# Patient Record
Sex: FEMALE | Race: WHITE | NOT HISPANIC OR LATINO | Employment: OTHER | URBAN - METROPOLITAN AREA
[De-identification: names, ages, dates, MRNs, and addresses within clinical notes are randomized per-mention and may not be internally consistent; named-entity substitution may affect disease eponyms.]

---

## 2017-06-16 ENCOUNTER — HISTORICAL (OUTPATIENT)
Dept: ADMINISTRATIVE | Facility: HOSPITAL | Age: 62
End: 2017-06-16

## 2017-06-16 LAB
ABS NEUT (OLG): 1.78 X10(3)/MCL (ref 2.1–9.2)
ALBUMIN SERPL-MCNC: 3.9 GM/DL (ref 3.4–5)
ALBUMIN/GLOB SERPL: 1.5 {RATIO}
ALP SERPL-CCNC: 58 UNIT/L (ref 38–126)
ALT SERPL-CCNC: 20 UNIT/L (ref 12–78)
AST SERPL-CCNC: 12 UNIT/L (ref 15–37)
BILIRUB SERPL-MCNC: 0.7 MG/DL (ref 0.2–1)
BILIRUBIN DIRECT+TOT PNL SERPL-MCNC: 0.1 MG/DL (ref 0–0.2)
BILIRUBIN DIRECT+TOT PNL SERPL-MCNC: 0.6 MG/DL (ref 0–0.8)
BUN SERPL-MCNC: 17 MG/DL (ref 7–18)
CALCIUM SERPL-MCNC: 8.9 MG/DL (ref 8.5–10.1)
CHLORIDE SERPL-SCNC: 104 MMOL/L (ref 98–107)
CHOLEST SERPL-MCNC: 221 MG/DL (ref 0–200)
CHOLEST/HDLC SERPL: 2.9 {RATIO} (ref 0–4)
CO2 SERPL-SCNC: 28 MMOL/L (ref 21–32)
CREAT SERPL-MCNC: 0.63 MG/DL (ref 0.55–1.02)
EOSINOPHIL NFR BLD MANUAL: 1 % (ref 0–8)
ERYTHROCYTE [DISTWIDTH] IN BLOOD BY AUTOMATED COUNT: 13.4 % (ref 11.5–17)
GLOBULIN SER-MCNC: 2.6 GM/DL (ref 2.4–3.5)
GLUCOSE SERPL-MCNC: 84 MG/DL (ref 74–106)
HCT VFR BLD AUTO: 40.8 % (ref 37–47)
HDLC SERPL-MCNC: 76 MG/DL (ref 35–60)
HGB BLD-MCNC: 13.7 GM/DL (ref 12–16)
LDLC SERPL CALC-MCNC: 138 MG/DL (ref 0–129)
LYMPHOCYTES NFR BLD MANUAL: 29 % (ref 13–40)
LYMPHOCYTES NFR BLD MANUAL: 7 %
MCH RBC QN AUTO: 31.1 PG (ref 27–31)
MCHC RBC AUTO-ENTMCNC: 33.6 GM/DL (ref 33–36)
MCV RBC AUTO: 92.7 FL (ref 80–94)
MONOCYTES NFR BLD MANUAL: 12 % (ref 2–11)
NEUTROPHILS NFR BLD MANUAL: 51 % (ref 47–80)
PLATELET # BLD AUTO: 273 X10(3)/MCL (ref 130–400)
PLATELET # BLD EST: NORMAL 10*3/UL
PMV BLD AUTO: 9.4 FL (ref 7.4–10.4)
POTASSIUM SERPL-SCNC: 4.3 MMOL/L (ref 3.5–5.1)
PROT SERPL-MCNC: 6.5 GM/DL (ref 6.4–8.2)
RBC # BLD AUTO: 4.4 X10(6)/MCL (ref 4.2–5.4)
SODIUM SERPL-SCNC: 139 MMOL/L (ref 136–145)
TRIGL SERPL-MCNC: 34 MG/DL (ref 30–150)
TSH SERPL-ACNC: 2 MIU/ML (ref 0.36–3.74)
VLDLC SERPL CALC-MCNC: 7 MG/DL
WBC # SPEC AUTO: 3.6 X10(3)/MCL (ref 4.5–11.5)

## 2018-11-28 LAB — CRC RECOMMENDATION EXT: NORMAL

## 2019-06-03 ENCOUNTER — HISTORICAL (OUTPATIENT)
Dept: PREADMISSION TESTING | Facility: HOSPITAL | Age: 64
End: 2019-06-03

## 2019-06-03 ENCOUNTER — HISTORICAL (OUTPATIENT)
Dept: ADMINISTRATIVE | Facility: HOSPITAL | Age: 64
End: 2019-06-03

## 2019-06-03 LAB
ABS NEUT (OLG): 4.48 X10(3)/MCL (ref 2.1–9.2)
ALBUMIN SERPL-MCNC: 4.3 GM/DL (ref 3.4–5)
ALBUMIN/GLOB SERPL: 1.3 RATIO (ref 1.1–2)
ALP SERPL-CCNC: 65 UNIT/L (ref 38–126)
ALT SERPL-CCNC: 23 UNIT/L (ref 12–78)
APTT PPP: 31.7 SECOND(S) (ref 24.2–33.9)
AST SERPL-CCNC: 17 UNIT/L (ref 15–37)
BASOPHILS # BLD AUTO: 0 X10(3)/MCL (ref 0–0.2)
BASOPHILS NFR BLD AUTO: 0 %
BILIRUB SERPL-MCNC: 0.5 MG/DL (ref 0.2–1)
BILIRUBIN DIRECT+TOT PNL SERPL-MCNC: 0.1 MG/DL (ref 0–0.5)
BILIRUBIN DIRECT+TOT PNL SERPL-MCNC: 0.4 MG/DL (ref 0–0.8)
BUN SERPL-MCNC: 22 MG/DL (ref 7–18)
CALCIUM SERPL-MCNC: 9.4 MG/DL (ref 8.5–10.1)
CHLORIDE SERPL-SCNC: 103 MMOL/L (ref 98–107)
CO2 SERPL-SCNC: 28 MMOL/L (ref 21–32)
CREAT SERPL-MCNC: 0.9 MG/DL (ref 0.55–1.02)
EOSINOPHIL # BLD AUTO: 0.1 X10(3)/MCL (ref 0–0.9)
EOSINOPHIL NFR BLD AUTO: 2 %
ERYTHROCYTE [DISTWIDTH] IN BLOOD BY AUTOMATED COUNT: 13.7 % (ref 11.5–17)
GLOBULIN SER-MCNC: 3.3 GM/DL (ref 2.4–3.5)
GLUCOSE SERPL-MCNC: 90 MG/DL (ref 74–106)
HCT VFR BLD AUTO: 44.6 % (ref 37–47)
HGB BLD-MCNC: 14.6 GM/DL (ref 12–16)
INR PPP: 1 (ref 0–1.3)
LYMPHOCYTES # BLD AUTO: 1.3 X10(3)/MCL (ref 0.6–4.6)
LYMPHOCYTES NFR BLD AUTO: 20 %
MCH RBC QN AUTO: 30.9 PG (ref 27–31)
MCHC RBC AUTO-ENTMCNC: 32.7 GM/DL (ref 33–36)
MCV RBC AUTO: 94.5 FL (ref 80–94)
MONOCYTES # BLD AUTO: 0.8 X10(3)/MCL (ref 0.1–1.3)
MONOCYTES NFR BLD AUTO: 11 %
NEUTROPHILS # BLD AUTO: 4.48 X10(3)/MCL (ref 2.1–9.2)
NEUTROPHILS NFR BLD AUTO: 66 %
PLATELET # BLD AUTO: 287 X10(3)/MCL (ref 130–400)
PMV BLD AUTO: 9.5 FL (ref 9.4–12.4)
POTASSIUM SERPL-SCNC: 4.3 MMOL/L (ref 3.5–5.1)
PROT SERPL-MCNC: 7.6 GM/DL (ref 6.4–8.2)
PROTHROMBIN TIME: 13.6 SECOND(S) (ref 12–14)
RBC # BLD AUTO: 4.72 X10(6)/MCL (ref 4.2–5.4)
SODIUM SERPL-SCNC: 137 MMOL/L (ref 136–145)
WBC # SPEC AUTO: 6.7 X10(3)/MCL (ref 4.5–11.5)

## 2019-06-14 ENCOUNTER — HISTORICAL (OUTPATIENT)
Dept: SURGERY | Facility: HOSPITAL | Age: 64
End: 2019-06-14

## 2020-02-23 ENCOUNTER — OFFICE VISIT (OUTPATIENT)
Dept: URGENT CARE | Facility: CLINIC | Age: 65
End: 2020-02-23
Payer: MEDICARE

## 2020-02-23 VITALS
HEART RATE: 50 BPM | RESPIRATION RATE: 20 BRPM | WEIGHT: 148 LBS | BODY MASS INDEX: 25.27 KG/M2 | SYSTOLIC BLOOD PRESSURE: 116 MMHG | TEMPERATURE: 99 F | OXYGEN SATURATION: 98 % | DIASTOLIC BLOOD PRESSURE: 70 MMHG | HEIGHT: 64 IN

## 2020-02-23 DIAGNOSIS — R50.9 FEVER, UNSPECIFIED FEVER CAUSE: ICD-10-CM

## 2020-02-23 DIAGNOSIS — B34.9 ACUTE VIRAL SYNDROME: Primary | ICD-10-CM

## 2020-02-23 LAB
CTP QC/QA: YES
FLUAV AG NPH QL: NEGATIVE
FLUBV AG NPH QL: NEGATIVE

## 2020-02-23 PROCEDURE — 87804 INFLUENZA ASSAY W/OPTIC: CPT | Mod: QW,S$GLB,, | Performed by: EMERGENCY MEDICINE

## 2020-02-23 PROCEDURE — 87804 POCT INFLUENZA A/B: ICD-10-PCS | Mod: QW,S$GLB,, | Performed by: EMERGENCY MEDICINE

## 2020-02-23 PROCEDURE — 99203 PR OFFICE/OUTPT VISIT, NEW, LEVL III, 30-44 MIN: ICD-10-PCS | Mod: 25,S$GLB,, | Performed by: EMERGENCY MEDICINE

## 2020-02-23 PROCEDURE — 99203 OFFICE O/P NEW LOW 30 MIN: CPT | Mod: 25,S$GLB,, | Performed by: EMERGENCY MEDICINE

## 2020-02-23 RX ORDER — ESTRADIOL 0.5 MG/1
0.5 TABLET ORAL
COMMUNITY
Start: 2015-06-30 | End: 2022-09-23

## 2020-02-23 RX ORDER — ZOLPIDEM TARTRATE 5 MG/1
5 TABLET ORAL
COMMUNITY
Start: 2019-06-14 | End: 2022-09-23

## 2020-02-23 NOTE — PROGRESS NOTES
"[facility] - Visit Note                                           Chief Complaint  64 y.o. female with Sinus Problem    History of Present Illness  Nazia Rodríguez presents to the urgent care with complaints of 3 days cough, congestion, sore throat.  Patient does not have fever.  She reports she is typically very active she is not take any medicine for symptoms.  She has not had any lung disease.    No past medical history on file.  No past surgical history on file.   Review of patient's allergies indicates:  No Known Allergies     Review of Systems and Physical Exam     Review of Systems  -- Constitution - no fever, no weight loss, no loss of consciousness  -- Eyes - no changes in vision, no redness, no swelling, no discharge  -- Ear, Nose - no  earache, no loss of hearing, no epistaxis  -- Mouth,Throat - reports sore throat, no toothache, normal voice, normal swallowing  -- Respiratory - reports cough and congestion, no shortness of breath, no wheezing, no increased WOB   -- Cardiovascular - denies chest pain, no palpitations, no lower extremity edema  -- Gastrointestinal - denies abdominal pain, denies nausea, vomiting, and diarrhea  -- Genitourinary - no dysuria, denies flank pain, no hematuria or frequency   -- Musculoskeletal - denies back pain, negative for myalgias and arthralgias   -- Neurological - no headache, no neurologic changes, no loss of bladder or bowel function no seizure like activity, no changes in hearing or vision  -- Skin - denies skin changes, no rash, no hives, no suspected skin infection    Vital Signs   height is 5' 4" (1.626 m) and weight is 67.1 kg (148 lb). Her oral temperature is 98.5 °F (36.9 °C). Her blood pressure is 116/70 and her pulse is 50 (abnormal). Her respiration is 20 and oxygen saturation is 98%.      Physical Exam  -- Nursing note and vitals reviewed -   -- Constitutional:  Awake alert and oriented, GCS 15, no acute distress.  Appears well.  -- Head: " Atraumatic. Normocephalic. No obvious abnormality  -- Eyes: Pupils are equal and reactive to light. Extraocular movements intact. No nystagmus.  No periorbital swelling. Normal conjunctiva.  -- Nose: Nose grossly normal in appearance, nares grossly normal. No rhinorrhea.  -- Throat: Mucous membranes moist, pharynx normal, normal tonsils.  Airway patent.  -- Ears: External ears and TM normal bilaterally. Normal hearing.   -- Neck: Normal range of motion. Neck supple. No meningismus. No adenopathy  -- Cardiac: Normal rate, regular rhythm and normal heart sounds. No carotid bruit. No lower extremity edema.  -- Pulmonary: Normal respiratory effort, breath sounds equal bilaterally. Adequate flow.  No wheezing.  No crackles. No increased work of breathing  -- Abdominal: Soft, no tenderness, no guarding, no rebound. Normal bowel sounds.   -- Musculoskeletal: Normal range of motion, all 4 extremities 5/5 strength.  Neurovascularly intact. Atraumatic. No deformities.  -- Neurological:  Cranial nerves 2-12 grossly intact. No focal deficits.   -- Vascular: Posterior tibial, dorsalis pedis and radial pulses 2+ bilaterally    -- Lymphatics: No cervical or peripheral lymphadenopathy.   -- Skin: Warm and dry. No evidence of rash or cellulitis  -- Psychiatric: Normal mood and affect. Bedside behavior is appropriate.  Patient is cooperative.  Denies suicidal homicidal ideation.    Emergency Room Course     Treatment Course, Evaluation, and Medical Decision Makin.  Physical exam unremarkable  2.  Influenza negative  3.  Discharge home with over-the-counter recommendations for symptom control      Diagnosis  -- acute viral syndrome  Disposition and Plan  -- Disposition: home  -- Condition: stable  -- Follow-up: Patient to follow up with Primary Doctor No in 1-2 days, and any specialists noted on discharge paperwork  -- I advised the patient that we have found no life threatening condition today and have provided recommendations  his/her care  -- At this time, I believe the patient is clinically stable for discharge.   -- The patient acknowledges that ongoing follow up with a MD is required   -- Patient agrees to comply with all instruction and direction given in the urgent care  -- Patient counseled on strict return precautions as discussed

## 2020-02-23 NOTE — PATIENT INSTRUCTIONS
"Over the Counter Medications for Upper Respiratory Infection:    1. Ibuprofen 800 mg every 8 hours. May alternate with tylenol 1000 mg every 4 hours. (Do not take tylenol with other sources of acetaminophen such as Dayquil)  2. Zyrtec or Claritin 10 mg daily  3. Delsym or Dayquil for cough and congestion  4. Flonase for congestion and sinus pressure    Viral Syndrome (Adult)  A viral illness may cause a number of symptoms. The symptoms depend on the part of the body that the virus affects. If it settles in your nose, throat, and lungs, it may cause cough, sore throat, congestion, and sometimes headache. If it settles in your stomach and intestinal tract, it may cause vomiting and diarrhea. Sometimes it causes vague symptoms like "aching all over," feeling tired, loss of appetite, or fever.  A viral illness usually lasts 1 to 2 weeks, but sometimes it lasts longer. In some cases, a more serious infection can look like a viral syndrome in the first few days of the illness. You may need another exam and additional tests to know the difference. Watch for the warning signs listed below.  Home care  Follow these guidelines for taking care of yourself at home:  · If symptoms are severe, rest at home for the first 2 to 3 days.  · Stay away from cigarette smoke - both your smoke and the smoke from others.  · You may use over-the-counter acetaminophen or ibuprofen for fever, muscle aching, and headache, unless another medicine was prescribed for this. If you have chronic liver or kidney disease or ever had a stomach ulcer or GI bleeding, talk with your doctor before using these medicines. No one who is younger than 18 and ill with a fever should take aspirin. It may cause severe disease or death.  · Your appetite may be poor, so a light diet is fine. Avoid dehydration by drinking 8 to 12 8-ounce glasses of fluids each day. This may include water; orange juice; lemonade; apple, grape, and cranberry juice; clear fruit drinks; " electrolyte replacement and sports drinks; and decaffeinated teas and coffee. If you have been diagnosed with a kidney disease, ask your doctor how much and what types of fluids you should drink to prevent dehydration. If you have kidney disease, drinking too much fluid can cause it build up in the your body and be dangerous to your health.  · Over-the-counter remedies won't shorten the length of the illness but may be helpful for cough, sore throat; and nasal and sinus congestion. Don't use decongestants if you have high blood pressure.  Follow-up care  Follow up with your healthcare provider if you do not improve over the next week.  Call 911  Get emergency medical care if any of the following occur:  · Convulsion  · Feeling weak, dizzy, or like you are going to faint  · Chest pain, shortness of breath, wheezing, or difficulty breathing  When to seek medical advice  Call your healthcare provider right away if any of these occur:  · Cough with lots of colored sputum (mucus) or blood in your sputum  · Chest pain, shortness of breath, wheezing, or difficulty breathing  · Severe headache; face, neck, or ear pain  · Severe, constant pain in the lower right side of your belly (abdominal)  · Continued vomiting (cant keep liquids down)  · Frequent diarrhea (more than 5 times a day); blood (red or black color) or mucus in diarrhea  · Feeling weak, dizzy, or like you are going to faint  · Extreme thirst  · Fever of 100.4°F (38°C) or higher, or as directed by your healthcare provider  Date Last Reviewed: 9/25/2015  © 3698-4731 [employer]. [address] [place] [address], [place] [address]. All rights reserved. This information is not intended as a substitute for professional medical care. Always follow your healthcare professional's instructions.

## 2020-06-16 ENCOUNTER — HISTORICAL (OUTPATIENT)
Dept: ADMINISTRATIVE | Facility: HOSPITAL | Age: 65
End: 2020-06-16

## 2021-10-07 ENCOUNTER — HISTORICAL (OUTPATIENT)
Dept: RADIOLOGY | Facility: HOSPITAL | Age: 66
End: 2021-10-07

## 2021-10-12 LAB — BCS RECOMMENDATION EXT: NORMAL

## 2022-01-27 ENCOUNTER — HISTORICAL (OUTPATIENT)
Dept: ADMINISTRATIVE | Facility: HOSPITAL | Age: 67
End: 2022-01-27

## 2022-02-05 ENCOUNTER — HISTORICAL (OUTPATIENT)
Dept: ADMINISTRATIVE | Facility: HOSPITAL | Age: 67
End: 2022-02-05

## 2022-02-05 LAB
ABS NEUT (OLG): 6.4 (ref 2.1–9.2)
ALBUMIN SERPL-MCNC: 2.7 G/DL (ref 3.4–4.8)
ALBUMIN/GLOB SERPL: 0.9 {RATIO} (ref 1.1–2)
ALP SERPL-CCNC: 228 U/L (ref 40–150)
ALT SERPL-CCNC: 43 U/L (ref 0–55)
AST SERPL-CCNC: 33 U/L (ref 5–34)
BASOPHILS # BLD AUTO: 0.05 10*3/UL (ref 0–0.2)
BASOPHILS NFR BLD AUTO: 0.6 % (ref 0–1)
BILIRUB SERPL-MCNC: 1.3 MG/DL (ref 0.2–1.2)
BILIRUBIN DIRECT+TOT PNL SERPL-MCNC: 0.4 (ref 0–0.5)
BILIRUBIN DIRECT+TOT PNL SERPL-MCNC: 0.9 (ref 0–0.8)
BUN SERPL-MCNC: 8.6 MG/DL (ref 9.8–20.1)
CALCIUM SERPL-MCNC: 8.5 MG/DL (ref 8.4–10.2)
CHLORIDE SERPL-SCNC: 103 MMOL/L (ref 98–107)
CO2 SERPL-SCNC: 24 MMOL/L (ref 23–31)
CREAT SERPL-MCNC: 0.49 MG/DL (ref 0.57–1.11)
EOSINOPHIL # BLD AUTO: 0.15 10*3/UL (ref 0–0.9)
EOSINOPHIL NFR BLD AUTO: 1.7 % (ref 0–6.4)
ERYTHROCYTE [DISTWIDTH] IN BLOOD BY AUTOMATED COUNT: 14.3 % (ref 11.5–17)
FERRITIN SERPL-MCNC: 1391.05 NG/ML (ref 4.63–204)
GLOBULIN SER-MCNC: 3.1 G/DL (ref 2.4–3.5)
GLUCOSE SERPL-MCNC: 115 MG/DL (ref 82–115)
HCT VFR BLD AUTO: 29.1 % (ref 37–47)
HEMOLYSIS INTERF INDEX SERPL-ACNC: 64
HGB BLD-MCNC: 9.6 G/DL (ref 12–16)
ICTERIC INTERF INDEX SERPL-ACNC: 1
IMM GRANULOCYTES # BLD AUTO: 0.22 10*3/UL (ref 0–0.02)
IMM GRANULOCYTES NFR BLD AUTO: 2.5 % (ref 0–0.43)
IRON SATN MFR SERPL: 18 % (ref 20–50)
IRON SERPL-MCNC: 39 UG/DL (ref 50–170)
LIPEMIC INTERF INDEX SERPL-ACNC: 11
LYMPHOCYTES # BLD AUTO: 0.78 10*3/UL (ref 0.6–4.6)
LYMPHOCYTES NFR BLD AUTO: 9 % (ref 16–44)
MANUAL DIFF? (OHS): NO
MCH RBC QN AUTO: 30.8 PG (ref 27–31)
MCHC RBC AUTO-ENTMCNC: 33 G/DL (ref 33–36)
MCV RBC AUTO: 93.3 FL (ref 80–94)
MONOCYTES # BLD AUTO: 1.1 10*3/UL (ref 0.1–1.3)
MONOCYTES NFR BLD AUTO: 12.6 % (ref 4–12.1)
NEUTROPHILS # BLD AUTO: 6.4 10*3/UL (ref 2.1–9.2)
NEUTROPHILS NFR BLD AUTO: 73.6 % (ref 43–73)
PLATELET # BLD AUTO: 421 10*3/UL (ref 130–400)
PMV BLD AUTO: 8.7 FL (ref 7.4–10.4)
POTASSIUM SERPL-SCNC: 4.6 MMOL/L (ref 3.5–5.1)
PREALB SERPL-MCNC: 13.5 (ref 14–37)
PROT SERPL-MCNC: 5.8 G/DL (ref 5.8–7.6)
RBC # BLD AUTO: 3.12 10*6/UL (ref 4.2–5.4)
SODIUM SERPL-SCNC: 138 MMOL/L (ref 136–145)
TIBC SERPL-MCNC: 177 UG/DL (ref 70–310)
TIBC SERPL-MCNC: 216 UG/DL (ref 250–450)
TRANSFERRIN SERPL-MCNC: 185 MG/DL (ref 173–360)
WBC # SPEC AUTO: 8.7 10*3/UL (ref 4.5–11.5)

## 2022-02-07 LAB
ABS NEUT (OLG): 6.44 (ref 2.1–9.2)
ALBUMIN SERPL-MCNC: 2.8 G/DL (ref 3.4–4.8)
ALBUMIN/GLOB SERPL: 0.9 {RATIO} (ref 1.1–2)
ALP SERPL-CCNC: 302 U/L (ref 40–150)
ALT SERPL-CCNC: 34 U/L (ref 0–55)
AST SERPL-CCNC: 20 U/L (ref 5–34)
BASOPHILS # BLD AUTO: 0.04 10*3/UL (ref 0–0.2)
BASOPHILS NFR BLD AUTO: 0.5 % (ref 0–1)
BILIRUB SERPL-MCNC: 0.8 MG/DL (ref 0.2–1.2)
BILIRUBIN DIRECT+TOT PNL SERPL-MCNC: 0.4 (ref 0–0.5)
BILIRUBIN DIRECT+TOT PNL SERPL-MCNC: 0.4 (ref 0–0.8)
BUN SERPL-MCNC: 11.7 MG/DL (ref 9.8–20.1)
CALCIUM SERPL-MCNC: 8.7 MG/DL (ref 8.4–10.2)
CHLORIDE SERPL-SCNC: 103 MMOL/L (ref 98–107)
CO2 SERPL-SCNC: 28 MMOL/L (ref 23–31)
CREAT SERPL-MCNC: 0.56 MG/DL (ref 0.57–1.11)
EOSINOPHIL # BLD AUTO: 0.22 10*3/UL (ref 0–0.9)
EOSINOPHIL NFR BLD AUTO: 2.5 % (ref 0–6.4)
ERYTHROCYTE [DISTWIDTH] IN BLOOD BY AUTOMATED COUNT: 14.4 % (ref 11.5–17)
GLOBULIN SER-MCNC: 3 G/DL (ref 2.4–3.5)
GLUCOSE SERPL-MCNC: 96 MG/DL (ref 82–115)
HCT VFR BLD AUTO: 28 % (ref 37–47)
HEMOLYSIS INTERF INDEX SERPL-ACNC: -2
HGB BLD-MCNC: 9.1 G/DL (ref 12–16)
ICTERIC INTERF INDEX SERPL-ACNC: 1
IMM GRANULOCYTES # BLD AUTO: 0.22 10*3/UL (ref 0–0.02)
IMM GRANULOCYTES NFR BLD AUTO: 2.5 % (ref 0–0.43)
LYMPHOCYTES # BLD AUTO: 0.8 10*3/UL (ref 0.6–4.6)
LYMPHOCYTES NFR BLD AUTO: 9 % (ref 16–44)
MANUAL DIFF? (OHS): NO
MCH RBC QN AUTO: 31.2 PG (ref 27–31)
MCHC RBC AUTO-ENTMCNC: 32.5 G/DL (ref 33–36)
MCV RBC AUTO: 95.9 FL (ref 80–94)
MONOCYTES # BLD AUTO: 1.16 10*3/UL (ref 0.1–1.3)
MONOCYTES NFR BLD AUTO: 13.1 % (ref 4–12.1)
NEUTROPHILS # BLD AUTO: 6.44 10*3/UL (ref 2.1–9.2)
NEUTROPHILS NFR BLD AUTO: 72.4 % (ref 43–73)
NRBC BLD AUTO-RTO: 0 % (ref 0–0.2)
PLATELET # BLD AUTO: 486 10*3/UL (ref 130–400)
PMV BLD AUTO: 8.3 FL (ref 7.4–10.4)
POTASSIUM SERPL-SCNC: 4.5 MMOL/L (ref 3.5–5.1)
PREALB SERPL-MCNC: 13.1 (ref 14–37)
PROT SERPL-MCNC: 5.8 G/DL (ref 5.8–7.6)
RBC # BLD AUTO: 2.92 10*6/UL (ref 4.2–5.4)
SODIUM SERPL-SCNC: 140 MMOL/L (ref 136–145)
WBC # SPEC AUTO: 8.9 10*3/UL (ref 4.5–11.5)

## 2022-02-14 LAB
ABS NEUT (OLG): 3.51 (ref 2.1–9.2)
ALBUMIN SERPL-MCNC: 3.4 G/DL (ref 3.4–4.8)
ALBUMIN/GLOB SERPL: 1.1 {RATIO} (ref 1.1–2)
ALP SERPL-CCNC: 415 U/L (ref 40–150)
ALT SERPL-CCNC: 18 U/L (ref 0–55)
AST SERPL-CCNC: 20 U/L (ref 5–34)
BASOPHILS NFR BLD MANUAL: 0 % (ref 0–2)
BILIRUB SERPL-MCNC: 0.6 MG/DL (ref 0.2–1.2)
BILIRUBIN DIRECT+TOT PNL SERPL-MCNC: 0.3 (ref 0–0.5)
BILIRUBIN DIRECT+TOT PNL SERPL-MCNC: 0.3 (ref 0–0.8)
BUN SERPL-MCNC: 14.9 MG/DL (ref 9.8–20.1)
CALCIUM SERPL-MCNC: 9.4 MG/DL (ref 8.4–10.2)
CHLORIDE SERPL-SCNC: 104 MMOL/L (ref 98–107)
CO2 SERPL-SCNC: 28 MMOL/L (ref 23–31)
CREAT SERPL-MCNC: 0.57 MG/DL (ref 0.57–1.11)
EOSINOPHIL NFR BLD MANUAL: 1 % (ref 0–8)
ERYTHROCYTE [DISTWIDTH] IN BLOOD BY AUTOMATED COUNT: 14.3 % (ref 11.5–17)
GLOBULIN SER-MCNC: 3.1 G/DL (ref 2.4–3.5)
GLUCOSE SERPL-MCNC: 108 MG/DL (ref 82–115)
GRANULOCYTES NFR BLD MANUAL: 77 % (ref 47–80)
HCT VFR BLD AUTO: 30.7 % (ref 37–47)
HEMOLYSIS INTERF INDEX SERPL-ACNC: 11
HEMOLYSIS INTERF INDEX SERPL-ACNC: 8
HGB BLD-MCNC: 9.5 G/DL (ref 12–16)
HYPOCHROMIA BLD QL SMEAR: NORMAL
ICTERIC INTERF INDEX SERPL-ACNC: 1
LYMPHOCYTES NFR BLD MANUAL: 9 % (ref 13–40)
MANUAL DIFF? (OHS): YES
MCH RBC QN AUTO: 29.3 PG (ref 27–31)
MCHC RBC AUTO-ENTMCNC: 30.9 G/DL (ref 33–36)
MCV RBC AUTO: 94.8 FL (ref 80–94)
MONOCYTES NFR BLD MANUAL: 13 % (ref 2–11)
PLATELET # BLD AUTO: 473 10*3/UL (ref 130–400)
PLATELET # BLD EST: NORMAL 10*3/UL
PMV BLD AUTO: 8.3 FL (ref 7.4–10.4)
POTASSIUM SERPL-SCNC: 4.6 MMOL/L (ref 3.5–5.1)
POTASSIUM SERPL-SCNC: 5.4 MMOL/L (ref 3.5–5.1)
PREALB SERPL-MCNC: 18.2 (ref 14–37)
PROT SERPL-MCNC: 6.5 G/DL (ref 5.8–7.6)
RBC # BLD AUTO: 3.24 10*6/UL (ref 4.2–5.4)
RBC MORPH BLD: NORMAL
SODIUM SERPL-SCNC: 141 MMOL/L (ref 136–145)
WBC # SPEC AUTO: 5.2 10*3/UL (ref 4.5–11.5)

## 2022-02-18 ENCOUNTER — EXTERNAL HOSPITAL ADMISSION (OUTPATIENT)
Dept: ADMINISTRATIVE | Facility: CLINIC | Age: 67
End: 2022-02-18
Payer: MEDICARE

## 2022-02-18 ENCOUNTER — PATIENT OUTREACH (OUTPATIENT)
Dept: ADMINISTRATIVE | Facility: CLINIC | Age: 67
End: 2022-02-18
Payer: MEDICARE

## 2022-02-18 NOTE — PROGRESS NOTES
C3 nurse attempted to contact Nazia Rodríguez for a TCC post hospital discharge follow up call. The patient is unable to conduct the call @ this time. The patient requested a callback.    The patient does not have a scheduled HOSFU appointment within 7-14 days post hospital discharge date 2/17/2022. Patient does NOT have PCP on file.

## 2022-02-19 NOTE — PROGRESS NOTES
C3 nurse attempted to contact Nazia Rodríguez for a TCC post hospital discharge follow up call. No answer. The patient does not have a scheduled HOSFU appointment, and the pt does not have an [facility] PCP.

## 2022-02-21 NOTE — PROGRESS NOTES
C3 nurse attempted to contact patient for a TCC post hospital discharge follow-up call. The patient declined call at this time stating that she did not discharge from inpatient, but that she discharged after two weeks at rehabilitation facility.  This makes the patient N/A and chart reflects this change.

## 2022-03-09 ENCOUNTER — HISTORICAL (OUTPATIENT)
Dept: RADIOLOGY | Facility: HOSPITAL | Age: 67
End: 2022-03-09

## 2022-03-09 ENCOUNTER — HISTORICAL (OUTPATIENT)
Dept: ADMINISTRATIVE | Facility: HOSPITAL | Age: 67
End: 2022-03-09

## 2022-04-22 ENCOUNTER — HISTORICAL (OUTPATIENT)
Dept: ADMINISTRATIVE | Facility: HOSPITAL | Age: 67
End: 2022-04-22
Payer: MEDICARE

## 2022-05-01 DIAGNOSIS — I71.00 DISSECTION OF AORTA, UNSPECIFIED PORTION OF AORTA: Primary | ICD-10-CM

## 2022-05-18 ENCOUNTER — OFFICE VISIT (OUTPATIENT)
Dept: ORTHOPEDICS | Facility: CLINIC | Age: 67
End: 2022-05-18
Payer: MEDICARE

## 2022-05-18 ENCOUNTER — HOSPITAL ENCOUNTER (OUTPATIENT)
Dept: RADIOLOGY | Facility: CLINIC | Age: 67
Discharge: HOME | End: 2022-05-18
Attending: ORTHOPAEDIC SURGERY
Payer: MEDICARE

## 2022-05-18 DIAGNOSIS — S32.810D PELVIC RING FRACTURE WITH ROUTINE HEALING: Primary | ICD-10-CM

## 2022-05-18 DIAGNOSIS — S32.810D PELVIC RING FRACTURE WITH ROUTINE HEALING: ICD-10-CM

## 2022-05-18 PROCEDURE — 99213 PR OFFICE/OUTPT VISIT, EST, LEVL III, 20-29 MIN: ICD-10-PCS | Mod: ,,, | Performed by: ORTHOPAEDIC SURGERY

## 2022-05-18 PROCEDURE — 99213 OFFICE O/P EST LOW 20 MIN: CPT | Mod: ,,, | Performed by: ORTHOPAEDIC SURGERY

## 2022-05-18 RX ORDER — DULOXETIN HYDROCHLORIDE 30 MG/1
30 CAPSULE, DELAYED RELEASE ORAL
COMMUNITY
Start: 2022-02-17 | End: 2022-06-14 | Stop reason: SDUPTHER

## 2022-05-18 RX ORDER — BUSPIRONE HYDROCHLORIDE 5 MG/1
TABLET ORAL
COMMUNITY
Start: 2022-03-16 | End: 2022-11-08

## 2022-05-18 NOTE — PROGRESS NOTES
Subjective:       Patient ID: Nazia Rodríguez is a 66 y.o. female.    Chief Complaint   Patient presents with    Pelvis - Injury, Post-op Evaluation     3.5 MONTH F/U LEFT SI SCREW PLACEMENT.         Here today for follow-up evaluation now 3-1/2 months status post left transsacral trans iliac screw placement for an unstable pelvic ring.  She reports that she has been doing very well.  She has been working with physical therapy and is back to doing nearly all of her pre injury level activities including golf and pickle ball.  She is concerned about an area of protrusion over the right lower quadrant of her abdomen that is present when she bears down.  She was concerned that it could be due to her traumatic injury is requesting to have it evaluated today.    Injury  Pertinent negatives include no abdominal pain, chest pain, chills, congestion, coughing, fever, nausea, neck pain, numbness or vomiting.       Review of Systems   Constitutional: Negative for chills, fever and malaise/fatigue.   HENT: Negative for congestion and hearing loss.    Eyes: Negative for visual disturbance.   Cardiovascular: Negative for chest pain and syncope.   Respiratory: Negative for cough and shortness of breath.    Hematologic/Lymphatic: Does not bruise/bleed easily.   Skin: Negative for color change and suspicious lesions.   Musculoskeletal: Negative for falls and neck pain.   Gastrointestinal: Negative for abdominal pain, nausea and vomiting.   Genitourinary: Negative for dysuria and hematuria.   Neurological: Negative for numbness and sensory change.   Psychiatric/Behavioral: Negative for altered mental status. The patient is not nervous/anxious.         Current Outpatient Medications on File Prior to Visit   Medication Sig Dispense Refill    busPIRone (BUSPAR) 5 MG Tab TAKE ONE TABLET 3 TIMES DAILY      DULoxetine (CYMBALTA) 30 MG capsule Take 30 mg by mouth.      estradiol (ESTRACE) 0.5 MG tablet Take 0.5 mg by mouth.       zolpidem (AMBIEN) 5 MG Tab Take 5 mg by mouth.       No current facility-administered medications on file prior to visit.          Objective:      There were no vitals taken for this visit.  Physical Exam  Constitutional:       General: She is not in acute distress.     Appearance: Normal appearance. She is not ill-appearing.   HENT:      Head: Normocephalic and atraumatic.      Nose: No congestion.   Eyes:      Extraocular Movements: Extraocular movements intact.   Cardiovascular:      Rate and Rhythm: Normal rate and regular rhythm.      Pulses: Normal pulses.   Pulmonary:      Effort: Pulmonary effort is normal.      Breath sounds: Normal breath sounds.   Abdominal:      General: There is no distension.      Palpations: Abdomen is soft.          Comments: She appears to have abdominal wall diastasis in the region I outlined above when she bears down.  No evidence of incarcerated hernia.  Otherwise soft nontender nondistended.   Musculoskeletal:      Comments: Pelvis is stable when rocked, no tenderness to palpation over the pubic symphysis.  Percutaneous incisions of the left hip are well healed.  Full active range of motion of the hips, knees, ankles and digits.  No calf swelling or tenderness, no signs of DVT.   Skin:     General: Skin is warm and dry.   Neurological:      Mental Status: She is alert and oriented to person, place, and time. Mental status is at baseline.   Psychiatric:         Mood and Affect: Mood normal.         Behavior: Behavior normal.         Thought Content: Thought content normal.         Judgment: Judgment normal.        There is no height or weight on file to calculate BMI.    Radiology:  Pelvis three views:  Hardware intact.  Alignment maintained.  The anterior pelvic ring fractures are consolidated.  No displacement noted compared to previous films.            Assessment:         1. Pelvic ring fracture with routine healing  X-Ray Pelvis [facility]       Plan:       She is  doing well today with regard to her pelvic ring fracture.  She appears to have some abdominal wall muscle weakness in her right lower quadrant.  Does not appear to be hernia however it is bothersome and she would like to have it evaluated by general surgeon.  She will plan to call the offices of Dr. Michael Todd and to make an appointment have it evaluated.  From my standpoint she can continue full activities without restrictions.  I will see her back in 2 months for final set of x-rays.  She is happy with this plan of care today and all other questions were answered.     Zoran Sanchez MD  Orthopedic Trauma  [facility]      Follow up in about 2 months (around 7/18/2022).    Pelvic ring fracture with routine healing  -     X-Ray Pelvis [facility] [place] [facility] [place]; Future; Expected date: 05/18/2022              Orders Placed This Encounter   Procedures    X-Ray Pelvis [facility] [facility]     Standing Status:   Future     Number of Occurrences:   1     Standing Expiration Date:   5/17/2023     Order Specific Question:   May the Radiologist modify the order per protocol to meet the clinical needs of the patient?     Answer:   Yes     Order Specific Question:   Release to patient     Answer:   Immediate       Future Appointments   Date Time Provider Department Center   5/30/2022  9:00 AM Johnny Hansen MD [id number] [place]   10/18/2022  2:00 PM Casimiro Kat II, MD [id number]  [place]
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
## 2022-05-23 DIAGNOSIS — K43.9 HERNIA OF ABDOMINAL WALL: Primary | ICD-10-CM

## 2022-05-26 ENCOUNTER — HOSPITAL ENCOUNTER (OUTPATIENT)
Dept: RADIOLOGY | Facility: HOSPITAL | Age: 67
Discharge: HOME | End: 2022-05-26
Attending: THORACIC SURGERY (CARDIOTHORACIC VASCULAR SURGERY)
Payer: MEDICARE

## 2022-05-26 DIAGNOSIS — I71.00 DISSECTION OF AORTA: ICD-10-CM

## 2022-05-26 LAB
CREAT SERPL-MCNC: 0.6 MG/DL (ref 0.5–1.4)
SAMPLE: NORMAL

## 2022-05-26 PROCEDURE — 71275 CT ANGIOGRAPHY CHEST: CPT | Mod: TC

## 2022-05-26 PROCEDURE — 25500020 PHARM REV CODE 255: Performed by: THORACIC SURGERY (CARDIOTHORACIC VASCULAR SURGERY)

## 2022-05-26 RX ADMIN — IOPAMIDOL 100 ML: 755 INJECTION, SOLUTION INTRAVENOUS at 03:05

## 2022-05-30 ENCOUNTER — OFFICE VISIT (OUTPATIENT)
Dept: CARDIAC SURGERY | Facility: CLINIC | Age: 67
End: 2022-05-30
Payer: MEDICARE

## 2022-05-30 VITALS — HEART RATE: 60 BPM | DIASTOLIC BLOOD PRESSURE: 83 MMHG | SYSTOLIC BLOOD PRESSURE: 147 MMHG

## 2022-05-30 DIAGNOSIS — R91.1 NODULE OF RIGHT LUNG: ICD-10-CM

## 2022-05-30 DIAGNOSIS — I71.012 DESCENDING THORACIC AORTIC DISSECTION: Primary | ICD-10-CM

## 2022-05-30 PROCEDURE — 99213 PR OFFICE/OUTPT VISIT, EST, LEVL III, 20-29 MIN: ICD-10-PCS | Mod: ,,, | Performed by: THORACIC SURGERY (CARDIOTHORACIC VASCULAR SURGERY)

## 2022-05-30 PROCEDURE — 99213 OFFICE O/P EST LOW 20 MIN: CPT | Mod: ,,, | Performed by: THORACIC SURGERY (CARDIOTHORACIC VASCULAR SURGERY)
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
## 2022-05-30 NOTE — ASSESSMENT & PLAN NOTE
Patient is doing well.  CTA reveals no evidence of ongoing extravasation or hematoma.  More CC-TAG device appropriately placed and functioning well.  Of note, the patient has a small right lung nodule which will be followed on CT scan in 1 year as recommended by Radiology.

## 2022-06-08 ENCOUNTER — TELEPHONE (OUTPATIENT)
Dept: CARDIAC SURGERY | Facility: CLINIC | Age: 67
End: 2022-06-08
Payer: MEDICARE

## 2022-06-08 NOTE — TELEPHONE ENCOUNTER
Pt asking about referral-sent to [facility]-called [facility] and received.  Dr. Burns does not see pt's in clinic, only at hospital.  Appt made for pt to see TORRI Still NP on 6-28-22 at 10 am.  Called pt-states she wants to see a Dr. Not NP and can't go that day.  Number given to [facility] for pt to call back and schedule appt that fits into her schedule.  Pt also asked about sending referral to Dr. Cruz,Pulmonologist.  No referral orders noted per Dr. Hansen to Dr. Cruz.  Pt states she saw Dr. Cruz in the hospital.  Will check with Dr. Hansen to see if he wants us to send referral.  Pt verbalized understanding.     ----- Message from Nayeli Flores sent at 6/8/2022  8:39 AM CDT -----  Regarding: referrals  Contact: pt  PLEASE CALL THE PT BACK #[phone] RE:SHE STATED THAT SHE NEEDS DISCUSS TWO REFERRAL THAT WERE GOING TO BE SENT OUT PER DR HANSEN

## 2022-06-14 RX ORDER — DULOXETIN HYDROCHLORIDE 60 MG/1
60 CAPSULE, DELAYED RELEASE ORAL DAILY
Qty: 90 CAPSULE | Refills: 0 | Status: SHIPPED | OUTPATIENT
Start: 2022-06-14 | End: 2022-09-23

## 2022-06-14 RX ORDER — NITROFURANTOIN 25; 75 MG/1; MG/1
100 CAPSULE ORAL 2 TIMES DAILY
Qty: 14 CAPSULE | Refills: 0 | Status: SHIPPED | OUTPATIENT
Start: 2022-06-14 | End: 2022-06-21

## 2022-06-14 RX ORDER — DULOXETIN HYDROCHLORIDE 60 MG/1
60 CAPSULE, DELAYED RELEASE ORAL DAILY
COMMUNITY
End: 2022-06-14 | Stop reason: SDUPTHER

## 2022-06-14 RX ORDER — NITROFURANTOIN 25; 75 MG/1; MG/1
100 CAPSULE ORAL 2 TIMES DAILY
COMMUNITY
End: 2022-06-14 | Stop reason: SDUPTHER

## 2022-06-14 RX ORDER — DULOXETIN HYDROCHLORIDE 30 MG/1
30 CAPSULE, DELAYED RELEASE ORAL DAILY
Qty: 90 CAPSULE | Refills: 0 | Status: SHIPPED | OUTPATIENT
Start: 2022-06-14 | End: 2022-09-19

## 2022-06-14 RX ORDER — TRIAZOLAM 0.25 MG/1
0.25 TABLET ORAL NIGHTLY PRN
Qty: 90 TABLET | Refills: 0 | Status: SHIPPED | OUTPATIENT
Start: 2022-06-14 | End: 2022-09-12

## 2022-06-16 ENCOUNTER — TELEPHONE (OUTPATIENT)
Dept: CARDIAC SURGERY | Facility: CLINIC | Age: 67
End: 2022-06-16
Payer: MEDICARE

## 2022-06-16 NOTE — TELEPHONE ENCOUNTER
Asked for med rec to be sent to this MD for appt tomorrow.  Will send per request.  Pt verb understanding.     ----- Message from Nayeli Flores sent at 6/16/2022  9:19 AM CDT -----  Regarding: appt 6/17/22  Contact: Pt  Pt called needs all records faxed to Dr Maldonado in [facility] fax #[phone] phone#[phone]..Please send it today per pts request.

## 2022-06-17 ENCOUNTER — OFFICE VISIT (OUTPATIENT)
Dept: CARDIOLOGY | Facility: CLINIC | Age: 67
End: 2022-06-17
Payer: MEDICARE

## 2022-06-17 VITALS
WEIGHT: 138 LBS | HEIGHT: 64 IN | DIASTOLIC BLOOD PRESSURE: 83 MMHG | BODY MASS INDEX: 23.56 KG/M2 | RESPIRATION RATE: 20 BRPM | SYSTOLIC BLOOD PRESSURE: 136 MMHG | HEART RATE: 62 BPM

## 2022-06-17 DIAGNOSIS — I25.110 ATHEROSCLEROSIS OF NATIVE CORONARY ARTERY OF NATIVE HEART WITH UNSTABLE ANGINA PECTORIS: ICD-10-CM

## 2022-06-17 DIAGNOSIS — I73.9 PVD (PERIPHERAL VASCULAR DISEASE): ICD-10-CM

## 2022-06-17 DIAGNOSIS — I10 PRIMARY HYPERTENSION: ICD-10-CM

## 2022-06-17 DIAGNOSIS — E78.49 OTHER HYPERLIPIDEMIA: ICD-10-CM

## 2022-06-17 DIAGNOSIS — I71.012 DESCENDING THORACIC AORTIC DISSECTION: Primary | ICD-10-CM

## 2022-06-17 PROCEDURE — 93005 ELECTROCARDIOGRAM TRACING: CPT | Mod: PBBFAC,PO | Performed by: INTERNAL MEDICINE

## 2022-06-17 PROCEDURE — 99213 OFFICE O/P EST LOW 20 MIN: CPT | Mod: PBBFAC,PO | Performed by: INTERNAL MEDICINE

## 2022-06-17 PROCEDURE — 93010 ELECTROCARDIOGRAM REPORT: CPT | Mod: S$PBB,,, | Performed by: INTERNAL MEDICINE

## 2022-06-17 PROCEDURE — 99205 OFFICE O/P NEW HI 60 MIN: CPT | Mod: S$PBB,,, | Performed by: INTERNAL MEDICINE

## 2022-06-17 PROCEDURE — 99999 PR PBB SHADOW E&M-EST. PATIENT-LVL III: ICD-10-PCS | Mod: PBBFAC,,, | Performed by: INTERNAL MEDICINE

## 2022-06-17 PROCEDURE — 99999 PR PBB SHADOW E&M-EST. PATIENT-LVL III: CPT | Mod: PBBFAC,,, | Performed by: INTERNAL MEDICINE

## 2022-06-17 PROCEDURE — 93010 EKG 12-LEAD: ICD-10-PCS | Mod: S$PBB,,, | Performed by: INTERNAL MEDICINE

## 2022-06-17 PROCEDURE — 99205 PR OFFICE/OUTPT VISIT, NEW, LEVL V, 60-74 MIN: ICD-10-PCS | Mod: S$PBB,,, | Performed by: INTERNAL MEDICINE

## 2022-06-17 RX ORDER — ROSUVASTATIN CALCIUM 5 MG/1
5 TABLET, COATED ORAL DAILY
Qty: 90 TABLET | Refills: 3 | Status: SHIPPED | OUTPATIENT
Start: 2022-06-17 | End: 2023-08-16 | Stop reason: SDUPTHER

## 2022-06-17 NOTE — ASSESSMENT & PLAN NOTE
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  Follow-up CTA May 2022 looks great without any evidence of dissection or hematoma.    Aortic atherosclerosis noted.  Will initiate baby aspirin and rosuvastatin 5 x 1.

## 2022-06-17 NOTE — ASSESSMENT & PLAN NOTE
Elevated calcium score, aortic atherosclerosis, and a previous LDL of 138.  Recommended issues of rosuvastatin 5 x 1.  Additionally discussed the benefits of a plant based diet.
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
## 2022-06-17 NOTE — ASSESSMENT & PLAN NOTE
Blood pressures are mildly elevated today and during previous check.  The patient will buy a blood pressure cuff and start monitoring blood pressures.  She understands average systolic should be less than 30 and diastolic less than 80. If she is above this goal, we shall consider antihypertensive therapy.

## 2022-06-17 NOTE — ASSESSMENT & PLAN NOTE
Patient has asymptomatic coronary atherosclerosis.  Recommend initiation of aspirin and statin therapy in addition to risk factor modification.
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
## 2022-06-21 ENCOUNTER — OFFICE VISIT (OUTPATIENT)
Dept: SURGICAL ONCOLOGY | Facility: CLINIC | Age: 67
End: 2022-06-21
Payer: MEDICARE

## 2022-06-21 VITALS
BODY MASS INDEX: 23.56 KG/M2 | HEIGHT: 64 IN | DIASTOLIC BLOOD PRESSURE: 66 MMHG | HEART RATE: 72 BPM | WEIGHT: 138 LBS | SYSTOLIC BLOOD PRESSURE: 113 MMHG

## 2022-06-21 DIAGNOSIS — D17.23 LIPOMA OF RIGHT LOWER EXTREMITY: ICD-10-CM

## 2022-06-21 DIAGNOSIS — R22.2 LOCALIZED SWELLING OF ABDOMINAL WALL: Primary | ICD-10-CM

## 2022-06-21 PROCEDURE — 99213 OFFICE O/P EST LOW 20 MIN: CPT | Mod: PBBFAC | Performed by: SURGERY

## 2022-06-21 PROCEDURE — 99203 OFFICE O/P NEW LOW 30 MIN: CPT | Mod: S$PBB,,, | Performed by: SURGERY

## 2022-06-21 PROCEDURE — 99999 PR PBB SHADOW E&M-EST. PATIENT-LVL III: CPT | Mod: PBBFAC,,, | Performed by: SURGERY

## 2022-06-21 PROCEDURE — 99999 PR PBB SHADOW E&M-EST. PATIENT-LVL III: ICD-10-PCS | Mod: PBBFAC,,, | Performed by: SURGERY

## 2022-06-21 PROCEDURE — 99203 PR OFFICE/OUTPT VISIT, NEW, LEVL III, 30-44 MIN: ICD-10-PCS | Mod: S$PBB,,, | Performed by: SURGERY

## 2022-08-09 ENCOUNTER — DOCUMENTATION ONLY (OUTPATIENT)
Dept: ADMINISTRATIVE | Facility: HOSPITAL | Age: 67
End: 2022-08-09
Payer: MEDICARE

## 2022-08-19 ENCOUNTER — OFFICE VISIT (OUTPATIENT)
Dept: ORTHOPEDICS | Facility: CLINIC | Age: 67
End: 2022-08-19
Payer: MEDICARE

## 2022-08-19 ENCOUNTER — HOSPITAL ENCOUNTER (OUTPATIENT)
Dept: RADIOLOGY | Facility: CLINIC | Age: 67
Discharge: HOME | End: 2022-08-19
Attending: STUDENT IN AN ORGANIZED HEALTH CARE EDUCATION/TRAINING PROGRAM
Payer: MEDICARE

## 2022-08-19 VITALS
WEIGHT: 138 LBS | DIASTOLIC BLOOD PRESSURE: 74 MMHG | HEART RATE: 55 BPM | HEIGHT: 64 IN | SYSTOLIC BLOOD PRESSURE: 131 MMHG | BODY MASS INDEX: 23.56 KG/M2

## 2022-08-19 DIAGNOSIS — M62.838 MUSCLE SPASM OF LEFT SHOULDER AREA: ICD-10-CM

## 2022-08-19 DIAGNOSIS — M25.512 LEFT SHOULDER PAIN, UNSPECIFIED CHRONICITY: ICD-10-CM

## 2022-08-19 DIAGNOSIS — S40.022A CONTUSION OF MULTIPLE SITES OF LEFT UPPER EXTREMITY, INITIAL ENCOUNTER: ICD-10-CM

## 2022-08-19 DIAGNOSIS — M25.512 LEFT SHOULDER PAIN, UNSPECIFIED CHRONICITY: Primary | ICD-10-CM

## 2022-08-19 PROCEDURE — 99215 OFFICE O/P EST HI 40 MIN: CPT | Mod: ,,, | Performed by: STUDENT IN AN ORGANIZED HEALTH CARE EDUCATION/TRAINING PROGRAM

## 2022-08-19 PROCEDURE — 99215 PR OFFICE/OUTPT VISIT, EST, LEVL V, 40-54 MIN: ICD-10-PCS | Mod: ,,, | Performed by: STUDENT IN AN ORGANIZED HEALTH CARE EDUCATION/TRAINING PROGRAM

## 2022-08-19 PROCEDURE — 73030 X-RAY EXAM OF SHOULDER: CPT | Mod: LT,,, | Performed by: STUDENT IN AN ORGANIZED HEALTH CARE EDUCATION/TRAINING PROGRAM

## 2022-08-19 PROCEDURE — 73030 XR SHOULDER COMPLETE 2 OR MORE VIEWS LEFT: ICD-10-PCS | Mod: LT,,, | Performed by: STUDENT IN AN ORGANIZED HEALTH CARE EDUCATION/TRAINING PROGRAM

## 2022-08-19 RX ORDER — ASPIRIN 81 MG/1
81 TABLET ORAL DAILY
COMMUNITY

## 2022-08-19 RX ORDER — DICLOFENAC SODIUM 75 MG/1
75 TABLET, DELAYED RELEASE ORAL 2 TIMES DAILY PRN
Qty: 60 TABLET | Refills: 0 | Status: SHIPPED | OUTPATIENT
Start: 2022-08-19 | End: 2022-09-18

## 2022-08-19 RX ORDER — CYCLOBENZAPRINE HCL 5 MG
5 TABLET ORAL 2 TIMES DAILY PRN
Qty: 20 TABLET | Refills: 0 | Status: SHIPPED | OUTPATIENT
Start: 2022-08-19 | End: 2022-11-08
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
## 2022-08-30 ENCOUNTER — DOCUMENTATION ONLY (OUTPATIENT)
Dept: ADMINISTRATIVE | Facility: HOSPITAL | Age: 67
End: 2022-08-30
Payer: MEDICARE

## 2022-09-13 ENCOUNTER — DOCUMENTATION ONLY (OUTPATIENT)
Dept: INTERNAL MEDICINE | Facility: CLINIC | Age: 67
End: 2022-09-13
Payer: MEDICARE

## 2022-09-13 ENCOUNTER — TELEPHONE (OUTPATIENT)
Dept: CARDIOLOGY | Facility: CLINIC | Age: 67
End: 2022-09-13
Payer: MEDICARE

## 2022-09-13 NOTE — TELEPHONE ENCOUNTER
----- Message from Winifred Pink MA sent at 9/13/2022  1:20 PM CDT -----  The patient would like to talk  to you about schedule an appointment please call [phone]. Thank you.

## 2022-09-16 ENCOUNTER — LAB VISIT (OUTPATIENT)
Dept: LAB | Facility: HOSPITAL | Age: 67
End: 2022-09-16
Attending: INTERNAL MEDICINE
Payer: MEDICARE

## 2022-09-16 DIAGNOSIS — E78.49 OTHER HYPERLIPIDEMIA: ICD-10-CM

## 2022-09-16 LAB
CHOLEST SERPL-MCNC: 219 MG/DL
CHOLEST/HDLC SERPL: 3 {RATIO} (ref 0–5)
HDLC SERPL-MCNC: 70 MG/DL (ref 35–60)
LDLC SERPL CALC-MCNC: 141 MG/DL (ref 50–140)
TRIGL SERPL-MCNC: 42 MG/DL (ref 37–140)
VLDLC SERPL CALC-MCNC: 8 MG/DL

## 2022-09-16 PROCEDURE — 36415 COLL VENOUS BLD VENIPUNCTURE: CPT

## 2022-09-16 PROCEDURE — 80061 LIPID PANEL: CPT

## 2022-09-23 ENCOUNTER — OFFICE VISIT (OUTPATIENT)
Dept: CARDIOLOGY | Facility: CLINIC | Age: 67
End: 2022-09-23
Payer: MEDICARE

## 2022-09-23 DIAGNOSIS — E78.49 OTHER HYPERLIPIDEMIA: ICD-10-CM

## 2022-09-23 DIAGNOSIS — I73.9 PVD (PERIPHERAL VASCULAR DISEASE): ICD-10-CM

## 2022-09-23 DIAGNOSIS — I10 PRIMARY HYPERTENSION: ICD-10-CM

## 2022-09-23 DIAGNOSIS — I25.10 ATHEROSCLEROSIS OF NATIVE CORONARY ARTERY OF NATIVE HEART WITHOUT ANGINA PECTORIS: ICD-10-CM

## 2022-09-23 PROCEDURE — 99213 OFFICE O/P EST LOW 20 MIN: CPT | Mod: 95,,, | Performed by: INTERNAL MEDICINE

## 2022-09-23 PROCEDURE — 99213 PR OFFICE/OUTPT VISIT, EST, LEVL III, 20-29 MIN: ICD-10-PCS | Mod: 95,,, | Performed by: INTERNAL MEDICINE

## 2022-09-23 NOTE — ASSESSMENT & PLAN NOTE
Elevated calcium score, aortic atherosclerosis, and LDL around 140. Recommended rosuvastatin 5 x 1. Additionally discussed the benefits of a plant based diet.
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
## 2022-09-23 NOTE — ASSESSMENT & PLAN NOTE
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  Follow-up CTA May 2022 looks great without any evidence of dissection or hematoma.     Aortic atherosclerosis noted.  On asa 81x1 and starting rosuvastatin.

## 2022-09-27 ENCOUNTER — PATIENT MESSAGE (OUTPATIENT)
Dept: CARDIOLOGY | Facility: CLINIC | Age: 67
End: 2022-09-27
Payer: MEDICARE

## 2022-11-07 ENCOUNTER — HOSPITAL ENCOUNTER (OUTPATIENT)
Dept: RADIOLOGY | Facility: HOSPITAL | Age: 67
Discharge: HOME | End: 2022-11-07
Attending: STUDENT IN AN ORGANIZED HEALTH CARE EDUCATION/TRAINING PROGRAM
Payer: MEDICARE

## 2022-11-07 ENCOUNTER — HOSPITAL ENCOUNTER (OUTPATIENT)
Dept: RADIOLOGY | Facility: HOSPITAL | Age: 67
Discharge: HOME | End: 2022-11-07
Attending: THORACIC SURGERY (CARDIOTHORACIC VASCULAR SURGERY)
Payer: MEDICARE

## 2022-11-07 DIAGNOSIS — Z12.31 BREAST CANCER SCREENING BY MAMMOGRAM: ICD-10-CM

## 2022-11-07 DIAGNOSIS — I71.012 DESCENDING THORACIC AORTIC DISSECTION: ICD-10-CM

## 2022-11-07 LAB
CREAT SERPL-MCNC: 0.5 MG/DL (ref 0.5–1.4)
SAMPLE: NORMAL

## 2022-11-07 PROCEDURE — 71275 CT ANGIOGRAPHY CHEST: CPT | Mod: TC

## 2022-11-07 PROCEDURE — 77067 SCR MAMMO BI INCL CAD: CPT | Mod: 26,,, | Performed by: RADIOLOGY

## 2022-11-07 PROCEDURE — 77067 MAMMO DIGITAL SCREENING BILAT WITH TOMO: ICD-10-PCS | Mod: 26,,, | Performed by: RADIOLOGY

## 2022-11-07 PROCEDURE — 77067 SCR MAMMO BI INCL CAD: CPT | Mod: TC

## 2022-11-07 PROCEDURE — 25500020 PHARM REV CODE 255: Performed by: THORACIC SURGERY (CARDIOTHORACIC VASCULAR SURGERY)

## 2022-11-07 PROCEDURE — 77063 BREAST TOMOSYNTHESIS BI: CPT | Mod: TC

## 2022-11-07 PROCEDURE — 77063 MAMMO DIGITAL SCREENING BILAT WITH TOMO: ICD-10-PCS | Mod: 26,,, | Performed by: RADIOLOGY

## 2022-11-07 PROCEDURE — 77063 BREAST TOMOSYNTHESIS BI: CPT | Mod: 26,,, | Performed by: RADIOLOGY

## 2022-11-07 RX ADMIN — IOPAMIDOL 100 ML: 755 INJECTION, SOLUTION INTRAVENOUS at 09:11

## 2022-11-08 ENCOUNTER — OFFICE VISIT (OUTPATIENT)
Dept: INTERNAL MEDICINE | Facility: CLINIC | Age: 67
End: 2022-11-08
Payer: MEDICARE

## 2022-11-08 VITALS
HEART RATE: 60 BPM | DIASTOLIC BLOOD PRESSURE: 78 MMHG | OXYGEN SATURATION: 96 % | WEIGHT: 138 LBS | RESPIRATION RATE: 18 BRPM | SYSTOLIC BLOOD PRESSURE: 122 MMHG | BODY MASS INDEX: 23.56 KG/M2 | HEIGHT: 64 IN

## 2022-11-08 DIAGNOSIS — L03.90 CELLULITIS, UNSPECIFIED CELLULITIS SITE: Primary | ICD-10-CM

## 2022-11-08 PROCEDURE — 99213 PR OFFICE/OUTPT VISIT, EST, LEVL III, 20-29 MIN: ICD-10-PCS | Mod: ,,, | Performed by: INTERNAL MEDICINE

## 2022-11-08 PROCEDURE — 99213 OFFICE O/P EST LOW 20 MIN: CPT | Mod: ,,, | Performed by: INTERNAL MEDICINE

## 2022-11-08 RX ORDER — TRIAZOLAM 0.25 MG/1
0.25 TABLET ORAL NIGHTLY PRN
COMMUNITY
Start: 2022-09-19 | End: 2022-11-29

## 2022-11-08 RX ORDER — SULFAMETHOXAZOLE AND TRIMETHOPRIM 800; 160 MG/1; MG/1
1 TABLET ORAL 2 TIMES DAILY
Qty: 14 TABLET | Refills: 0 | Status: SHIPPED | OUTPATIENT
Start: 2022-11-08 | End: 2022-11-15

## 2022-11-08 RX ORDER — TRAZODONE HYDROCHLORIDE 50 MG/1
50 TABLET ORAL NIGHTLY
Qty: 30 TABLET | Refills: 5 | Status: SHIPPED | OUTPATIENT
Start: 2022-11-08 | End: 2023-04-11

## 2022-11-08 NOTE — PROGRESS NOTES
Patient ID: Nazia Rodríguez is a 67 y.o. female.    Chief Complaint: Skin Problem (Skin tag in groin area, tender, redness, infection?)      HPI:   Patient presents here today for above reason.     Nazia is a 67-year-old presents today for evaluation of infected skin lesion on the left inner thigh.  She has noticed today while at the gym.  Painful no discharges indurated with no fluctuance.    The patient's Health Maintenance was reviewed and the following appears to be due at this time:   Health Maintenance Due   Topic Date Due    Hepatitis C Screening  Never done    Pneumococcal Vaccines (Age 65+) (1 - PCV) Never done    COVID-19 Vaccine (3 - Booster for Pfizer series) 2021    Influenza Vaccine (1) 2022        Past Medical History:  Past Medical History:   Diagnosis Date    Anxiety disorder, unspecified     Claustrophobia     Depression     Descending thoracic aortic dissection     High cholesterol     Insomnia     Mitral valve prolapse     Obesity, unspecified     Personal history of colonic polyps      Past Surgical History:   Procedure Laterality Date    CATARACT EXTRACTION       SECTION      HYSTERECTOMY      NECK SURGERY       Review of patient's allergies indicates:  No Known Allergies  Current Outpatient Medications on File Prior to Visit   Medication Sig Dispense Refill    aspirin (ECOTRIN) 81 MG EC tablet Take 81 mg by mouth once daily.      rosuvastatin (CRESTOR) 5 MG tablet Take 1 tablet (5 mg total) by mouth once daily. 90 tablet 3    triazolam (HALCION) 0.25 MG Tab Take 0.25 mg by mouth nightly as needed.      [DISCONTINUED] busPIRone (BUSPAR) 5 MG Tab TAKE ONE TABLET 3 TIMES DAILY      [DISCONTINUED] cyclobenzaprine (FLEXERIL) 5 MG tablet Take 1 tablet (5 mg total) by mouth 2 (two) times daily as needed for Muscle spasms. (Patient not taking: Reported on 2022) 20 tablet 0    [DISCONTINUED] DULoxetine (CYMBALTA) 30 MG capsule TAKE ONE CAPSULE ONCE DAILY (Patient not  "taking: Reported on 11/8/2022) 90 capsule 0     No current facility-administered medications on file prior to visit.     Social History     Socioeconomic History    Marital status: Single   Tobacco Use    Smoking status: Never    Smokeless tobacco: Never   Substance and Sexual Activity    Drug use: Never    Sexual activity: Never     Family History   Problem Relation Age of Onset    No Known Problems Mother     No Known Problems Father     Heart attack Neg Hx     Fainting Neg Hx     Heart disease Neg Hx     Heart failure Neg Hx     Hyperlipidemia Neg Hx     Hypertension Neg Hx     Stroke Neg Hx     Atrial Septal Defect Neg Hx        ROS:   Comprehensive review of systems was performed and is negative except as noted above    Vitals/PE:   /78 (BP Location: Left arm, Patient Position: Sitting)   Pulse 60   Resp 18   Ht 5' 4" (1.626 m)   Wt 62.6 kg (138 lb)   SpO2 96%   BMI 23.69 kg/m²   Physical Exam    General: Alert and oriented, No acute distress.   Eye: Normal conjunctiva without exudate.  HENMT: Normocephalic/AT, Normal hearing, Oral mucosa is moist and pink   Neck: No goiter visualized.   Respiratory: Lungs CTAB, Respirations are non-labored, Breath sounds are equal, Symmetrical chest wall expansion.  Cardiovascular: Normal rate, Regular rhythm, No murmur, No edema.   Gastrointestinal: Non-distended.   Genitourinary: Deferred.  Musculoskeletal: Normal ROM, Normal gait, No deformities or amputations.  Integumentary: Warm, Dry, Intact. No diaphoresis, or flushing.  Neurologic: No focal deficits, Cranial Nerves II-XII are grossly intact.   Psychiatric: Cooperative, Appropriate mood & affect, Normal judgment, Non-suicidal.    Assessment/Plan:       1. Cellulitis, unspecified cellulitis site         Plan:  Bactrim ds bid x 7 days  One compresses.    not ready for drainage.  Okay to start trazodone for insomnia.    Education and counseling done face to face regarding medical conditions and plan. Contact " office if new symptoms develop. Should any symptoms ever significantly worsen seek emergency medical attention/go to ER. Follow up at least yearly for wellness or sooner PRN. Nurse to call patient with any results. The patient is receptive, expresses understanding and is agreeable to plan. All questions have been answered.    No follow-ups on file.

## 2022-11-23 DIAGNOSIS — N39.0 URINARY TRACT INFECTION WITHOUT HEMATURIA, SITE UNSPECIFIED: Primary | ICD-10-CM

## 2022-11-23 RX ORDER — SULFAMETHOXAZOLE AND TRIMETHOPRIM 800; 160 MG/1; MG/1
1 TABLET ORAL 2 TIMES DAILY
Qty: 14 TABLET | Refills: 0 | Status: SHIPPED | OUTPATIENT
Start: 2022-11-23 | End: 2022-11-30

## 2022-12-12 RX ORDER — NALTREXONE HYDROCHLORIDE 50 MG/1
TABLET, FILM COATED ORAL
Qty: 30 TABLET | Refills: 2 | Status: SHIPPED | OUTPATIENT
Start: 2022-12-12 | End: 2022-12-13 | Stop reason: SDUPTHER

## 2022-12-13 ENCOUNTER — TELEPHONE (OUTPATIENT)
Dept: INTERNAL MEDICINE | Facility: CLINIC | Age: 67
End: 2022-12-13
Payer: MEDICARE

## 2022-12-13 DIAGNOSIS — F41.9 ANXIETY: Primary | ICD-10-CM

## 2022-12-13 DIAGNOSIS — F10.29 ALCOHOL DEPENDENCE WITH UNSPECIFIED ALCOHOL-INDUCED DISORDER: Primary | ICD-10-CM

## 2022-12-13 RX ORDER — NALTREXONE HYDROCHLORIDE 50 MG/1
50 TABLET, FILM COATED ORAL DAILY PRN
Qty: 30 TABLET | Refills: 2 | Status: SHIPPED | OUTPATIENT
Start: 2022-12-13 | End: 2023-04-17

## 2022-12-13 RX ORDER — SERTRALINE HYDROCHLORIDE 50 MG/1
50 TABLET, FILM COATED ORAL DAILY
Qty: 30 TABLET | Refills: 11 | Status: SHIPPED | OUTPATIENT
Start: 2022-12-13 | End: 2023-04-11

## 2023-01-25 ENCOUNTER — TELEPHONE (OUTPATIENT)
Dept: INTERNAL MEDICINE | Facility: CLINIC | Age: 68
End: 2023-01-25
Payer: MEDICARE

## 2023-01-25 NOTE — TELEPHONE ENCOUNTER
Pt notified of recommendations, voiced unsderstanding   Pt requested nicotine patch. Attempted to get ahold of provider.   Will call back

## 2023-02-16 ENCOUNTER — TELEPHONE (OUTPATIENT)
Dept: INTERNAL MEDICINE | Facility: CLINIC | Age: 68
End: 2023-02-16
Payer: MEDICARE

## 2023-02-16 RX ORDER — NIRMATRELVIR AND RITONAVIR 300-100 MG
KIT ORAL
Qty: 30 TABLET | Refills: 0 | Status: CANCELLED | OUTPATIENT
Start: 2023-02-16

## 2023-03-02 DIAGNOSIS — F41.9 ANXIETY: Primary | ICD-10-CM

## 2023-03-02 RX ORDER — ALPRAZOLAM 0.5 MG/1
0.5 TABLET ORAL DAILY PRN
Qty: 30 TABLET | Refills: 0 | Status: SHIPPED | OUTPATIENT
Start: 2023-03-02 | End: 2023-04-11

## 2023-04-06 ENCOUNTER — TELEPHONE (OUTPATIENT)
Dept: INTERNAL MEDICINE | Facility: CLINIC | Age: 68
End: 2023-04-06
Payer: MEDICARE

## 2023-04-06 NOTE — TELEPHONE ENCOUNTER
----- Message from Nilsa Albert sent at 4/6/2023  9:32 AM CDT -----  Regarding: RX Refill Request  Type:  RX Refill Request    Who Called: pt  Refill or New Rx:refill  RX Name and Strength:ALPRAZolam (XANAX) 0.5 MG tablet  How is the patient currently taking it? (ex. 1XDay):1x  Is this a 30 day or 90 day RX: 30  Preferred Pharmacy with phone number:[facility] [address]  Local or Mail Order: local  Ordering Provider:Dr. Kat  Would the patient rather a call back or a response via [facility]?   Best Call Back Number: [phone]  Additional Information: pt called needing medication to be refilled. Stated she is only taking Crestor now. Please call pt.

## 2023-04-11 ENCOUNTER — OFFICE VISIT (OUTPATIENT)
Dept: INTERNAL MEDICINE | Facility: CLINIC | Age: 68
End: 2023-04-11
Payer: MEDICARE

## 2023-04-11 VITALS
HEIGHT: 64 IN | WEIGHT: 138 LBS | BODY MASS INDEX: 23.56 KG/M2 | RESPIRATION RATE: 18 BRPM | SYSTOLIC BLOOD PRESSURE: 108 MMHG | OXYGEN SATURATION: 97 % | HEART RATE: 63 BPM | DIASTOLIC BLOOD PRESSURE: 68 MMHG

## 2023-04-11 DIAGNOSIS — E78.49 OTHER HYPERLIPIDEMIA: ICD-10-CM

## 2023-04-11 DIAGNOSIS — R91.1 NODULE OF RIGHT LUNG: ICD-10-CM

## 2023-04-11 DIAGNOSIS — I71.012 DESCENDING THORACIC AORTIC DISSECTION: ICD-10-CM

## 2023-04-11 DIAGNOSIS — J43.9 PULMONARY EMPHYSEMA, UNSPECIFIED EMPHYSEMA TYPE: Primary | ICD-10-CM

## 2023-04-11 DIAGNOSIS — Z09 FOLLOW-UP EXAM: ICD-10-CM

## 2023-04-11 PROBLEM — I71.00 DISSECTION OF AORTA: Status: ACTIVE | Noted: 2023-04-11

## 2023-04-11 PROCEDURE — 99214 PR OFFICE/OUTPT VISIT, EST, LEVL IV, 30-39 MIN: ICD-10-PCS | Mod: ,,, | Performed by: INTERNAL MEDICINE

## 2023-04-11 PROCEDURE — 99214 OFFICE O/P EST MOD 30 MIN: CPT | Mod: ,,, | Performed by: INTERNAL MEDICINE

## 2023-04-11 RX ORDER — AMITRIPTYLINE HYDROCHLORIDE 25 MG/1
25 TABLET, FILM COATED ORAL NIGHTLY PRN
Qty: 30 TABLET | Refills: 3 | Status: SHIPPED | OUTPATIENT
Start: 2023-04-11 | End: 2023-08-15 | Stop reason: SDUPTHER

## 2023-04-11 NOTE — PROGRESS NOTES
Patient ID: Nazia Rodríguez is a 67 y.o. female.    Chief Complaint: Follow-up, Insomnia (Trazodone, benadryl, halcion/Wants to try something new.), and Anxiety (Recent passing of mother, refills to help)      HPI:   Patient presents here today for above reason.     Ms. Mandujano is a 67-year-old female presenting today in follow-up.  Complaining of some insomnia for which she has been taking Halcion and alternating Benadryl.  Also little bit anxiety after her mother's passing.  Otherwise rest of her age-appropriate screening up-to-date she is on Crestor for lipids which normally well controlled.  Here for follow-up.    The patient's Health Maintenance was reviewed and the following appears to be due at this time:   Health Maintenance Due   Topic Date Due    Hepatitis C Screening  Never done    Pneumococcal Vaccines (Age 65+) (1 - PCV) Never done    COVID-19 Vaccine (3 - Booster for Pfizer series) 2021    Influenza Vaccine (1) 2022        Past Medical History:  Past Medical History:   Diagnosis Date    Anxiety disorder, unspecified     Claustrophobia     Depression     Descending thoracic aortic dissection     High cholesterol     Insomnia     Mitral valve prolapse     Obesity, unspecified     Personal history of colonic polyps      Past Surgical History:   Procedure Laterality Date    CATARACT EXTRACTION       SECTION      HYSTERECTOMY      NECK SURGERY       Review of patient's allergies indicates:  No Known Allergies  Current Outpatient Medications on File Prior to Visit   Medication Sig Dispense Refill    ALPRAZolam (XANAX) 0.5 MG tablet Take 1 tablet (0.5 mg total) by mouth daily as needed for Anxiety. 30 tablet 0    aspirin (ECOTRIN) 81 MG EC tablet Take 81 mg by mouth once daily.      naltrexone (DEPADE) 50 mg tablet Take 50 mg by mouth daily as needed (craving). 1 po daily and prn 30 tablet 2    rosuvastatin (CRESTOR) 5 MG tablet Take 1 tablet (5 mg total) by mouth once daily. 90 tablet  "3    triazolam (HALCION) 0.25 MG Tab TAKE ONE TABLET BY MOUTH nightly AS NEEDED FOR insomnia 90 tablet 0    [DISCONTINUED] sertraline (ZOLOFT) 50 MG tablet Take 1 tablet (50 mg total) by mouth once daily. 30 tablet 11    [DISCONTINUED] traZODone (DESYREL) 50 MG tablet Take 1 tablet (50 mg total) by mouth every evening. (Patient not taking: Reported on 4/11/2023) 30 tablet 5     No current facility-administered medications on file prior to visit.     Social History     Socioeconomic History    Marital status: Single   Tobacco Use    Smoking status: Never    Smokeless tobacco: Never   Substance and Sexual Activity    Drug use: Never    Sexual activity: Never     Family History   Problem Relation Age of Onset    No Known Problems Mother     No Known Problems Father     Heart attack Neg Hx     Fainting Neg Hx     Heart disease Neg Hx     Heart failure Neg Hx     Hyperlipidemia Neg Hx     Hypertension Neg Hx     Stroke Neg Hx     Atrial Septal Defect Neg Hx        ROS:   Comprehensive review of systems was performed and is negative except as noted above    Vitals/PE:   /68 (BP Location: Left arm, Patient Position: Sitting)   Pulse 63   Resp 18   Ht 5' 4" (1.626 m)   Wt 62.6 kg (138 lb)   SpO2 97%   BMI 23.69 kg/m²   Physical Exam    General: Alert and oriented, No acute distress.   Eye: Normal conjunctiva without exudate.  HENMT: Normocephalic/AT, Normal hearing, Oral mucosa is moist and pink   Neck: No goiter visualized.   Respiratory: Lungs CTAB, Respirations are non-labored, Breath sounds are equal, Symmetrical chest wall expansion.  Cardiovascular: Normal rate, Regular rhythm, No murmur, No edema.   Gastrointestinal: Non-distended.   Genitourinary: Deferred.  Musculoskeletal: Normal ROM, Normal gait, No deformities or amputations.  Integumentary: Warm, Dry, Intact. No diaphoresis, or flushing.  Neurologic: No focal deficits, Cranial Nerves II-XII are grossly intact.   Psychiatric: Cooperative, Appropriate " mood & affect, Normal judgment, Non-suicidal.    Assessment/Plan:       1. Pulmonary emphysema, unspecified emphysema type    2. Descending thoracic aortic dissection  Overview:  Patient is status post C tag for type B aortic dissection with persistent pain and extravasation.  She has been without complaints since discharge.      3. Other hyperlipidemia   On statin  4. Nodule of right lung  Overview:  Patient has been found to have a small right lung nodule undetermined etiology.  Radiology recommends CT scan in 1 year x2      5. Follow-up exam   Overall stable, ok to add elavil prn sleep.  Prn benadryl            Education and counseling done face to face regarding medical conditions and plan. Contact office if new symptoms develop. Should any symptoms ever significantly worsen seek emergency medical attention/go to ER. Follow up at least yearly for wellness or sooner PRN. Nurse to call patient with any results. The patient is receptive, expresses understanding and is agreeable to plan. All questions have been answered.    No follow-ups on file.

## 2023-04-19 ENCOUNTER — TELEPHONE (OUTPATIENT)
Dept: CARDIAC SURGERY | Facility: CLINIC | Age: 68
End: 2023-04-19
Payer: MEDICARE

## 2023-04-19 DIAGNOSIS — I71.012 DESCENDING THORACIC AORTIC DISSECTION: Primary | ICD-10-CM

## 2023-04-19 DIAGNOSIS — R91.1 NODULE OF RIGHT LUNG: ICD-10-CM

## 2023-04-19 NOTE — TELEPHONE ENCOUNTER
Called Ms. Mandujano, explained that as per protocol after her Thoracic Endovascular Aneurysm Repair (TEVAR) on 2/1/2022, we need to get a 1-year CTA of Chest/Aorta.  Explained that I have put the order in and the hospital will call to schedule her. As per Dr. Hansen's written orders on his May 30, 2022 progress note, as well as per Dr. Hansen's V/O on 4/18/2023, CTA Chest / Aorta order entered into Epic.  I have been in contact with Bebe at Dr. Hill's (Pulmonologist) office re: this as they are also following her re: a lung nodule. MACY Cruz RN

## 2023-05-01 ENCOUNTER — TELEPHONE (OUTPATIENT)
Dept: CARDIAC SURGERY | Facility: CLINIC | Age: 68
End: 2023-05-01
Payer: MEDICARE

## 2023-05-01 NOTE — TELEPHONE ENCOUNTER
RETURNED CALL TO PT RE: QUESTIONS CTA CHEST/AORTA. NO ANSWER, LEFT MESSAGE THAT DR. OSBORN ORDERED A 1-YEAR CTA PER S/P AORTIC DISSECTION. THIS CTA SHOULD BE DONE IN MAY 2023. MACY CÁRDENAS RN

## 2023-05-16 ENCOUNTER — HOSPITAL ENCOUNTER (OUTPATIENT)
Dept: RADIOLOGY | Facility: HOSPITAL | Age: 68
Discharge: HOME | End: 2023-05-16
Attending: THORACIC SURGERY (CARDIOTHORACIC VASCULAR SURGERY)
Payer: MEDICARE

## 2023-05-16 DIAGNOSIS — R91.1 NODULE OF RIGHT LUNG: ICD-10-CM

## 2023-05-16 DIAGNOSIS — I71.012 DESCENDING THORACIC AORTIC DISSECTION: ICD-10-CM

## 2023-05-16 LAB
CREAT SERPL-MCNC: 0.6 MG/DL (ref 0.5–1.4)
SAMPLE: NORMAL

## 2023-05-16 PROCEDURE — 25500020 PHARM REV CODE 255: Performed by: THORACIC SURGERY (CARDIOTHORACIC VASCULAR SURGERY)

## 2023-05-16 PROCEDURE — 71275 CT ANGIOGRAPHY CHEST: CPT | Mod: TC

## 2023-05-16 RX ADMIN — IOPAMIDOL 75 ML: 755 INJECTION, SOLUTION INTRAVENOUS at 08:05

## 2023-05-22 RX ORDER — TRIAZOLAM 0.25 MG/1
TABLET ORAL
Qty: 90 TABLET | Refills: 0 | Status: SHIPPED | OUTPATIENT
Start: 2023-05-22 | End: 2023-08-15 | Stop reason: SDUPTHER

## 2023-06-05 ENCOUNTER — OFFICE VISIT (OUTPATIENT)
Dept: CARDIAC SURGERY | Facility: CLINIC | Age: 68
End: 2023-06-05
Payer: MEDICARE

## 2023-06-05 VITALS
HEIGHT: 64 IN | HEART RATE: 57 BPM | WEIGHT: 148 LBS | BODY MASS INDEX: 25.27 KG/M2 | OXYGEN SATURATION: 98 % | DIASTOLIC BLOOD PRESSURE: 86 MMHG | SYSTOLIC BLOOD PRESSURE: 144 MMHG

## 2023-06-05 DIAGNOSIS — I71.012 DESCENDING THORACIC AORTIC DISSECTION: Primary | ICD-10-CM

## 2023-06-05 PROCEDURE — 99214 PR OFFICE/OUTPT VISIT, EST, LEVL IV, 30-39 MIN: ICD-10-PCS | Mod: ,,, | Performed by: THORACIC SURGERY (CARDIOTHORACIC VASCULAR SURGERY)

## 2023-06-05 PROCEDURE — 99214 OFFICE O/P EST MOD 30 MIN: CPT | Mod: ,,, | Performed by: THORACIC SURGERY (CARDIOTHORACIC VASCULAR SURGERY)

## 2023-06-05 NOTE — ASSESSMENT & PLAN NOTE
Patient is doing well status post TEVAR procedure.  CT scan reviewed.  She has no complaints.  Return to clinic in 1 year with CTA of the chest.

## 2023-06-05 NOTE — PROGRESS NOTES
Patient is status post TEVAR for small type B aortic dissection/intimal flap.  She is  doing well without complaints   Vital signs stable/afebrile   Regular rate and rhythm without murmurs rubs or gallops  Coarse breath sounds bilaterally   Wounds CDI   CTA interpreted and reviewed   A/P:  Doing well without complaints.  RTC in 1 year with CTA of the chest.

## 2023-06-10 DIAGNOSIS — F41.9 ANXIETY: ICD-10-CM

## 2023-06-10 RX ORDER — NYSTATIN 100000 [USP'U]/G
POWDER TOPICAL 4 TIMES DAILY
Qty: 60 G | Refills: 1 | Status: SHIPPED | OUTPATIENT
Start: 2023-06-10 | End: 2023-08-15 | Stop reason: SDUPTHER

## 2023-06-14 LAB — CRC RECOMMENDATION EXT: NORMAL

## 2023-06-15 ENCOUNTER — DOCUMENTATION ONLY (OUTPATIENT)
Dept: INTERNAL MEDICINE | Facility: CLINIC | Age: 68
End: 2023-06-15
Payer: MEDICARE

## 2023-06-27 DIAGNOSIS — F41.9 ANXIETY: ICD-10-CM

## 2023-06-27 RX ORDER — ALPRAZOLAM 0.5 MG/1
TABLET ORAL
Qty: 30 TABLET | Refills: 0 | Status: SHIPPED | OUTPATIENT
Start: 2023-06-27 | End: 2023-07-27 | Stop reason: SDUPTHER

## 2023-07-24 ENCOUNTER — TELEPHONE (OUTPATIENT)
Dept: INTERNAL MEDICINE | Facility: CLINIC | Age: 68
End: 2023-07-24
Payer: MEDICARE

## 2023-07-24 NOTE — TELEPHONE ENCOUNTER
----- Message from Chandrika Cruz sent at 7/24/2023 11:38 AM CDT -----  .Type:  Patient Returning Call    Who Called:pt   Who Left Message for Patient:ravi  Does the patient know what this is regarding?:medication   Would the patient rather a call back or a response via [facility]? Call back   Best Call Back Number:[phone]  Additional Information:

## 2023-07-24 NOTE — TELEPHONE ENCOUNTER
----- Message from [facility] sent at 7/24/2023  8:37 AM CDT -----  .Type:  Needs Medical Advice    Who Called:  pt    Symptoms (please be specific):  no alarming   systems    How long has patient had these symptoms:      Pharmacy name and phone #:   [facility]    Would the patient rather a call back? Yes    Best Call Back Number:  [phone] or [phone]    Additional Information:  pt ask if the doctor can please call her today as she is on anti depressant medication and would like better advice on what to do

## 2023-07-27 ENCOUNTER — OFFICE VISIT (OUTPATIENT)
Dept: INTERNAL MEDICINE | Facility: CLINIC | Age: 68
End: 2023-07-27
Payer: MEDICARE

## 2023-07-27 VITALS
HEART RATE: 72 BPM | WEIGHT: 138 LBS | HEIGHT: 64 IN | BODY MASS INDEX: 23.56 KG/M2 | OXYGEN SATURATION: 97 % | SYSTOLIC BLOOD PRESSURE: 124 MMHG | DIASTOLIC BLOOD PRESSURE: 70 MMHG

## 2023-07-27 DIAGNOSIS — F32.0 CURRENT MILD EPISODE OF MAJOR DEPRESSIVE DISORDER WITHOUT PRIOR EPISODE: ICD-10-CM

## 2023-07-27 DIAGNOSIS — F41.9 ANXIETY: ICD-10-CM

## 2023-07-27 PROBLEM — F32.A DEPRESSION: Status: ACTIVE | Noted: 2023-07-27

## 2023-07-27 PROCEDURE — 99214 PR OFFICE/OUTPT VISIT, EST, LEVL IV, 30-39 MIN: ICD-10-PCS | Mod: ,,, | Performed by: INTERNAL MEDICINE

## 2023-07-27 PROCEDURE — 99214 OFFICE O/P EST MOD 30 MIN: CPT | Mod: ,,, | Performed by: INTERNAL MEDICINE

## 2023-07-27 RX ORDER — ALPRAZOLAM 0.5 MG/1
TABLET ORAL
Qty: 30 TABLET | Refills: 2 | Status: SHIPPED | OUTPATIENT
Start: 2023-07-27 | End: 2023-08-15 | Stop reason: SDUPTHER

## 2023-07-27 RX ORDER — BUPROPION HYDROCHLORIDE 150 MG/1
150 TABLET ORAL DAILY
Qty: 30 TABLET | Refills: 5 | Status: SHIPPED | OUTPATIENT
Start: 2023-07-27 | End: 2023-08-15 | Stop reason: SDUPTHER

## 2023-07-27 NOTE — PROGRESS NOTES
Patient ID: Nazia Rodríguez is a 67 y.o. female.    Chief Complaint: Follow-up (Discuss meds)      HPI:   Patient presents here today for above reason.     Ms. Mandujano is a 67-year-old female presents today for evaluation of some depression after her mother's passing.  Also having to do with the estate by herself.  No thoughts of homicidal or suicidal ideation.  She is taking naltrexone on a p.r.n. basis which is helping.  She has inquired about medications are safe with naltrexone and help her get out of this depression.    The patient's Health Maintenance was reviewed and the following appears to be due at this time:   Health Maintenance Due   Topic Date Due    Hepatitis C Screening  Never done    Pneumococcal Vaccines (Age 65+) (1 - PCV) Never done    Hemoglobin A1c (Diabetic Prevention Screening)  Never done    COVID-19 Vaccine (3 - Pfizer series) 2021        Past Medical History:  Past Medical History:   Diagnosis Date    Anxiety disorder, unspecified     Claustrophobia     Depression     Descending thoracic aortic dissection     High cholesterol     Insomnia     Mitral valve prolapse     Obesity, unspecified     Personal history of colonic polyps      Past Surgical History:   Procedure Laterality Date    CATARACT EXTRACTION       SECTION      HYSTERECTOMY      NECK SURGERY       Review of patient's allergies indicates:  No Known Allergies  Current Outpatient Medications on File Prior to Visit   Medication Sig Dispense Refill    ALPRAZolam (XANAX) 0.5 MG tablet TAKE ONE TABLET ONCE DAILY AS NEEDED FOR ANXIETY 30 tablet 0    amitriptyline (ELAVIL) 25 MG tablet Take 1 tablet (25 mg total) by mouth nightly as needed for Insomnia. 30 tablet 3    aspirin (ECOTRIN) 81 MG EC tablet Take 81 mg by mouth once daily.      naltrexone (DEPADE) 50 mg tablet TAKE ONE TABLET BY MOUTH ONCE DAILY AND AS NEEDED FOR craving. maximum OF 2 TABLETS PER DAY. 30 tablet 2    nystatin (MYCOSTATIN) powder Apply topically  "4 (four) times daily. 60 g 1    rosuvastatin (CRESTOR) 5 MG tablet Take 1 tablet (5 mg total) by mouth once daily. 90 tablet 3    triazolam (HALCION) 0.25 MG Tab TAKE ONE TABLET BY MOUTH NIGHTLY AS NEEDED FOR INSOMNIA 90 tablet 0     No current facility-administered medications on file prior to visit.     Social History     Socioeconomic History    Marital status: Single   Tobacco Use    Smoking status: Former     Packs/day: 1.50     Years: 30.00     Pack years: 45.00     Types: Cigarettes    Smokeless tobacco: Never    Tobacco comments:     Quit 20 years ago   Substance and Sexual Activity    Drug use: Never    Sexual activity: Never     Family History   Problem Relation Age of Onset    No Known Problems Mother     No Known Problems Father     Heart attack Neg Hx     Fainting Neg Hx     Heart disease Neg Hx     Heart failure Neg Hx     Hyperlipidemia Neg Hx     Hypertension Neg Hx     Stroke Neg Hx     Atrial Septal Defect Neg Hx        ROS:   Comprehensive review of systems was performed and is negative except as noted above    Vitals/PE:   /70   Pulse 72   Ht 5' 4" (1.626 m)   Wt 62.6 kg (138 lb)   SpO2 97%   BMI 23.69 kg/m²   Physical Exam    General: Alert and oriented, No acute distress.   Eye: Normal conjunctiva without exudate.  HENMT: Normocephalic/AT, Normal hearing, Oral mucosa is moist and pink   Neck: No goiter visualized.   Respiratory: Lungs CTAB, Respirations are non-labored, Breath sounds are equal, Symmetrical chest wall expansion.  Cardiovascular: Normal rate, Regular rhythm, No murmur, No edema.   Gastrointestinal: Non-distended.   Genitourinary: Deferred.  Musculoskeletal: Normal ROM, Normal gait, No deformities or amputations.  Integumentary: Warm, Dry, Intact. No diaphoresis, or flushing.  Neurologic: No focal deficits, Cranial Nerves II-XII are grossly intact.   Psychiatric: Cooperative, Appropriate mood & affect, Normal judgment, Non-suicidal.    Assessment/Plan:       1. Current " mild episode of major depressive disorder without prior episode         Plan:  One will start Wellbutrin 150 mg daily if still symptomatic will add Prozac 10 mg daily.  Continue with diet exercise weight loss program exercises    Education and counseling done face to face regarding medical conditions and plan. Contact office if new symptoms develop. Should any symptoms ever significantly worsen seek emergency medical attention/go to ER. Follow up at least yearly for wellness or sooner PRN. Nurse to call patient with any results. The patient is receptive, expresses understanding and is agreeable to plan. All questions have been answered.    No follow-ups on file.

## 2023-08-15 ENCOUNTER — TELEPHONE (OUTPATIENT)
Dept: INTERNAL MEDICINE | Facility: CLINIC | Age: 68
End: 2023-08-15
Payer: MEDICARE

## 2023-08-15 DIAGNOSIS — F32.0 CURRENT MILD EPISODE OF MAJOR DEPRESSIVE DISORDER WITHOUT PRIOR EPISODE: Primary | ICD-10-CM

## 2023-08-15 DIAGNOSIS — F41.9 ANXIETY: ICD-10-CM

## 2023-08-15 RX ORDER — NYSTATIN 100000 [USP'U]/G
POWDER TOPICAL 4 TIMES DAILY
Qty: 60 G | Refills: 1 | Status: SHIPPED | OUTPATIENT
Start: 2023-08-15

## 2023-08-15 RX ORDER — AMITRIPTYLINE HYDROCHLORIDE 25 MG/1
25 TABLET, FILM COATED ORAL NIGHTLY PRN
Qty: 30 TABLET | Refills: 3 | Status: SHIPPED | OUTPATIENT
Start: 2023-08-15 | End: 2024-01-17

## 2023-08-15 RX ORDER — BUPROPION HYDROCHLORIDE 150 MG/1
150 TABLET ORAL DAILY
Qty: 30 TABLET | Refills: 5 | Status: SHIPPED | OUTPATIENT
Start: 2023-08-15 | End: 2024-01-10

## 2023-08-15 NOTE — TELEPHONE ENCOUNTER
----- Message from Courtney Joseph sent at 8/15/2023  9:10 AM CDT -----  Regardin month supply  .Type:  RX Refill Request    Who Called: pt  Refill or New Rx:refill  RX Name and Strength:  How is the patient currently taking it? (ex. 1XDay):  Is this a 30 day or 90 day RX:  Preferred Pharmacy with phone number:[facility] [place], [address]  Local or Mail Order:local  Ordering Provider:  Would the patient rather a call back or a response via [facility]?   Best Call Back Number:Mobile E-mail   [address] [phone]  Additional Information: Pt is requesting a 2 month supply of regular meds   ALPRAZolam (XANAX) 0.5 MG tablet  buPROPion (WELLBUTRIN XL) 150 MG TB24 tablet  nystatin (MYCOSTATIN) powder  amitriptyline (ELAVIL) 25 MG tablet  triazolam (HALCION) 0.25 MG Tab

## 2023-08-16 DIAGNOSIS — F10.29 ALCOHOL DEPENDENCE WITH UNSPECIFIED ALCOHOL-INDUCED DISORDER: ICD-10-CM

## 2023-08-16 DIAGNOSIS — E78.49 OTHER HYPERLIPIDEMIA: ICD-10-CM

## 2023-08-16 RX ORDER — TRIAZOLAM 0.25 MG/1
0.25 TABLET ORAL NIGHTLY PRN
Qty: 90 TABLET | Refills: 0 | Status: SHIPPED | OUTPATIENT
Start: 2023-08-16

## 2023-08-16 RX ORDER — ROSUVASTATIN CALCIUM 5 MG/1
5 TABLET, COATED ORAL DAILY
Qty: 90 TABLET | Refills: 3 | Status: SHIPPED | OUTPATIENT
Start: 2023-08-16 | End: 2024-01-17

## 2023-08-16 RX ORDER — ALPRAZOLAM 0.5 MG/1
TABLET ORAL
Qty: 30 TABLET | Refills: 2 | Status: SHIPPED | OUTPATIENT
Start: 2023-08-16

## 2023-08-17 RX ORDER — TRIAZOLAM 0.25 MG/1
TABLET ORAL
Qty: 90 TABLET | Refills: 0 | Status: SHIPPED | OUTPATIENT
Start: 2023-08-17 | End: 2024-01-17

## 2023-08-17 RX ORDER — NALTREXONE HYDROCHLORIDE 50 MG/1
TABLET, FILM COATED ORAL
Qty: 30 TABLET | Refills: 2 | Status: SHIPPED | OUTPATIENT
Start: 2023-08-17

## 2023-12-18 ENCOUNTER — HOSPITAL ENCOUNTER (OUTPATIENT)
Dept: RADIOLOGY | Facility: HOSPITAL | Age: 68
Discharge: HOME | End: 2023-12-18
Attending: STUDENT IN AN ORGANIZED HEALTH CARE EDUCATION/TRAINING PROGRAM
Payer: MEDICARE

## 2023-12-18 DIAGNOSIS — Z12.31 SCREENING MAMMOGRAM FOR HIGH-RISK PATIENT: ICD-10-CM

## 2023-12-18 PROCEDURE — 77067 SCR MAMMO BI INCL CAD: CPT | Mod: 26,,, | Performed by: RADIOLOGY

## 2023-12-18 PROCEDURE — 77063 MAMMO DIGITAL SCREENING BILAT WITH TOMO: ICD-10-PCS | Mod: 26,,, | Performed by: RADIOLOGY

## 2023-12-18 PROCEDURE — 77067 SCR MAMMO BI INCL CAD: CPT | Mod: TC

## 2023-12-18 PROCEDURE — 77067 MAMMO DIGITAL SCREENING BILAT WITH TOMO: ICD-10-PCS | Mod: 26,,, | Performed by: RADIOLOGY

## 2023-12-18 PROCEDURE — 77063 BREAST TOMOSYNTHESIS BI: CPT | Mod: 26,,, | Performed by: RADIOLOGY

## 2024-01-02 ENCOUNTER — HOSPITAL ENCOUNTER (INPATIENT)
Facility: HOSPITAL | Age: 69
LOS: 2 days | Discharge: HOME | DRG: 493 | End: 2024-01-04
Attending: STUDENT IN AN ORGANIZED HEALTH CARE EDUCATION/TRAINING PROGRAM | Admitting: SURGERY
Payer: MEDICARE

## 2024-01-02 ENCOUNTER — ANESTHESIA (OUTPATIENT)
Dept: SURGERY | Facility: HOSPITAL | Age: 69
DRG: 493 | End: 2024-01-02
Payer: MEDICARE

## 2024-01-02 ENCOUNTER — ANESTHESIA EVENT (OUTPATIENT)
Dept: SURGERY | Facility: HOSPITAL | Age: 69
DRG: 493 | End: 2024-01-02
Payer: MEDICARE

## 2024-01-02 DIAGNOSIS — S82.201A CLOSED FRACTURE OF RIGHT TIBIA AND FIBULA, INITIAL ENCOUNTER: ICD-10-CM

## 2024-01-02 DIAGNOSIS — T14.90XA TRAUMA: ICD-10-CM

## 2024-01-02 DIAGNOSIS — S82.871A CLOSED RIGHT PILON FRACTURE, INITIAL ENCOUNTER: ICD-10-CM

## 2024-01-02 DIAGNOSIS — M25.571 ACUTE RIGHT ANKLE PAIN: Primary | ICD-10-CM

## 2024-01-02 DIAGNOSIS — Z01.818 PRE-OP EVALUATION: ICD-10-CM

## 2024-01-02 DIAGNOSIS — S82.401A CLOSED FRACTURE OF RIGHT TIBIA AND FIBULA, INITIAL ENCOUNTER: ICD-10-CM
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
## 2024-01-02 LAB
ALBUMIN SERPL-MCNC: 4 G/DL (ref 3.4–4.8)
ALBUMIN/GLOB SERPL: 1.7 RATIO (ref 1.1–2)
ALP SERPL-CCNC: 94 UNIT/L (ref 40–150)
ALT SERPL-CCNC: 20 UNIT/L (ref 0–55)
AST SERPL-CCNC: 17 UNIT/L (ref 5–34)
BASOPHILS # BLD AUTO: 0.04 X10(3)/MCL
BASOPHILS NFR BLD AUTO: 0.9 %
BILIRUB SERPL-MCNC: 0.4 MG/DL
BUN SERPL-MCNC: 18.1 MG/DL (ref 9.8–20.1)
CALCIUM SERPL-MCNC: 9.1 MG/DL (ref 8.4–10.2)
CHLORIDE SERPL-SCNC: 110 MMOL/L (ref 98–107)
CO2 SERPL-SCNC: 21 MMOL/L (ref 23–31)
CREAT SERPL-MCNC: 0.63 MG/DL (ref 0.55–1.02)
EOSINOPHIL # BLD AUTO: 0.17 X10(3)/MCL (ref 0–0.9)
EOSINOPHIL NFR BLD AUTO: 3.9 %
ERYTHROCYTE [DISTWIDTH] IN BLOOD BY AUTOMATED COUNT: 13.8 % (ref 11.5–17)
GFR SERPLBLD CREATININE-BSD FMLA CKD-EPI: >60 MLS/MIN/1.73/M2
GLOBULIN SER-MCNC: 2.4 GM/DL (ref 2.4–3.5)
GLUCOSE SERPL-MCNC: 106 MG/DL (ref 82–115)
GROUP & RH: NORMAL
HCT VFR BLD AUTO: 41.5 % (ref 37–47)
HGB BLD-MCNC: 13.8 G/DL (ref 12–16)
IMM GRANULOCYTES # BLD AUTO: 0.01 X10(3)/MCL (ref 0–0.04)
IMM GRANULOCYTES NFR BLD AUTO: 0.2 %
INDIRECT COOMBS: NORMAL
INR PPP: 0.9
LYMPHOCYTES # BLD AUTO: 0.7 X10(3)/MCL (ref 0.6–4.6)
LYMPHOCYTES NFR BLD AUTO: 15.9 %
MCH RBC QN AUTO: 31.4 PG (ref 27–31)
MCHC RBC AUTO-ENTMCNC: 33.3 G/DL (ref 33–36)
MCV RBC AUTO: 94.5 FL (ref 80–94)
MONOCYTES # BLD AUTO: 0.6 X10(3)/MCL (ref 0.1–1.3)
MONOCYTES NFR BLD AUTO: 13.7 %
NEUTROPHILS # BLD AUTO: 2.87 X10(3)/MCL (ref 2.1–9.2)
NEUTROPHILS NFR BLD AUTO: 65.4 %
NRBC BLD AUTO-RTO: 0 %
PLATELET # BLD AUTO: 328 X10(3)/MCL (ref 130–400)
PMV BLD AUTO: 10.3 FL (ref 7.4–10.4)
POTASSIUM SERPL-SCNC: 4.6 MMOL/L (ref 3.5–5.1)
PROT SERPL-MCNC: 6.4 GM/DL (ref 5.8–7.6)
PROTHROMBIN TIME: 11.8 SECONDS (ref 12.5–14.5)
RBC # BLD AUTO: 4.39 X10(6)/MCL (ref 4.2–5.4)
SODIUM SERPL-SCNC: 140 MMOL/L (ref 136–145)
SPECIMEN OUTDATE: NORMAL
WBC # SPEC AUTO: 4.39 X10(3)/MCL (ref 4.5–11.5)

## 2024-01-02 PROCEDURE — 36000711: Performed by: ORTHOPAEDIC SURGERY

## 2024-01-02 PROCEDURE — 96361 HYDRATE IV INFUSION ADD-ON: CPT

## 2024-01-02 PROCEDURE — 0QSG04Z REPOSITION RIGHT TIBIA WITH INTERNAL FIXATION DEVICE, OPEN APPROACH: ICD-10-PCS | Performed by: ORTHOPAEDIC SURGERY

## 2024-01-02 PROCEDURE — 86850 RBC ANTIBODY SCREEN: CPT | Performed by: STUDENT IN AN ORGANIZED HEALTH CARE EDUCATION/TRAINING PROGRAM

## 2024-01-02 PROCEDURE — 63600175 PHARM REV CODE 636 W HCPCS: Performed by: NURSE PRACTITIONER

## 2024-01-02 PROCEDURE — 0QSJ04Z REPOSITION RIGHT FIBULA WITH INTERNAL FIXATION DEVICE, OPEN APPROACH: ICD-10-PCS | Performed by: ORTHOPAEDIC SURGERY

## 2024-01-02 PROCEDURE — 99900035 HC TECH TIME PER 15 MIN (STAT)

## 2024-01-02 PROCEDURE — 36000710: Performed by: ORTHOPAEDIC SURGERY

## 2024-01-02 PROCEDURE — 93010 ELECTROCARDIOGRAM REPORT: CPT | Mod: ,,, | Performed by: INTERNAL MEDICINE

## 2024-01-02 PROCEDURE — 71000033 HC RECOVERY, INTIAL HOUR: Performed by: ORTHOPAEDIC SURGERY

## 2024-01-02 PROCEDURE — 94799 UNLISTED PULMONARY SVC/PX: CPT

## 2024-01-02 PROCEDURE — 63600175 PHARM REV CODE 636 W HCPCS: Performed by: STUDENT IN AN ORGANIZED HEALTH CARE EDUCATION/TRAINING PROGRAM

## 2024-01-02 PROCEDURE — 64445 NJX AA&/STRD SCIATIC NRV IMG: CPT | Performed by: ANESTHESIOLOGY

## 2024-01-02 PROCEDURE — 71000039 HC RECOVERY, EACH ADD'L HOUR: Performed by: ORTHOPAEDIC SURGERY

## 2024-01-02 PROCEDURE — 99222 1ST HOSP IP/OBS MODERATE 55: CPT | Mod: AI,,, | Performed by: SURGERY

## 2024-01-02 PROCEDURE — 96375 TX/PRO/DX INJ NEW DRUG ADDON: CPT

## 2024-01-02 PROCEDURE — 80053 COMPREHEN METABOLIC PANEL: CPT | Performed by: STUDENT IN AN ORGANIZED HEALTH CARE EDUCATION/TRAINING PROGRAM

## 2024-01-02 PROCEDURE — C1713 ANCHOR/SCREW BN/BN,TIS/BN: HCPCS | Performed by: ORTHOPAEDIC SURGERY

## 2024-01-02 PROCEDURE — 25000003 PHARM REV CODE 250: Performed by: NURSE PRACTITIONER

## 2024-01-02 PROCEDURE — D9220A PRA ANESTHESIA: Mod: CRNA,,, | Performed by: NURSE ANESTHETIST, CERTIFIED REGISTERED

## 2024-01-02 PROCEDURE — 99223 1ST HOSP IP/OBS HIGH 75: CPT | Mod: 57,ICN,, | Performed by: ORTHOPAEDIC SURGERY

## 2024-01-02 PROCEDURE — 29515 APPLICATION SHORT LEG SPLINT: CPT | Mod: RT

## 2024-01-02 PROCEDURE — D9220A PRA ANESTHESIA: Mod: ANES,,, | Performed by: ANESTHESIOLOGY

## 2024-01-02 PROCEDURE — 27828 TREAT LOWER LEG FRACTURE: CPT | Mod: RT,,, | Performed by: ORTHOPAEDIC SURGERY

## 2024-01-02 PROCEDURE — 11000001 HC ACUTE MED/SURG PRIVATE ROOM

## 2024-01-02 PROCEDURE — 93005 ELECTROCARDIOGRAM TRACING: CPT

## 2024-01-02 PROCEDURE — 96374 THER/PROPH/DIAG INJ IV PUSH: CPT

## 2024-01-02 PROCEDURE — 85610 PROTHROMBIN TIME: CPT | Performed by: STUDENT IN AN ORGANIZED HEALTH CARE EDUCATION/TRAINING PROGRAM

## 2024-01-02 PROCEDURE — 37000008 HC ANESTHESIA 1ST 15 MINUTES: Performed by: ORTHOPAEDIC SURGERY

## 2024-01-02 PROCEDURE — 3E0T3BZ INTRODUCTION OF ANESTHETIC AGENT INTO PERIPHERAL NERVES AND PLEXI, PERCUTANEOUS APPROACH: ICD-10-PCS | Performed by: ANESTHESIOLOGY

## 2024-01-02 PROCEDURE — 37000009 HC ANESTHESIA EA ADD 15 MINS: Performed by: ORTHOPAEDIC SURGERY

## 2024-01-02 PROCEDURE — 27201423 OPTIME MED/SURG SUP & DEVICES STERILE SUPPLY: Performed by: ORTHOPAEDIC SURGERY

## 2024-01-02 PROCEDURE — 99285 EMERGENCY DEPT VISIT HI MDM: CPT | Mod: 25

## 2024-01-02 PROCEDURE — 85025 COMPLETE CBC W/AUTO DIFF WBC: CPT | Performed by: STUDENT IN AN ORGANIZED HEALTH CARE EDUCATION/TRAINING PROGRAM

## 2024-01-02 PROCEDURE — 25000003 PHARM REV CODE 250: Performed by: NURSE ANESTHETIST, CERTIFIED REGISTERED

## 2024-01-02 PROCEDURE — 63600175 PHARM REV CODE 636 W HCPCS: Performed by: ANESTHESIOLOGY

## 2024-01-02 PROCEDURE — 63600175 PHARM REV CODE 636 W HCPCS: Performed by: ORTHOPAEDIC SURGERY

## 2024-01-02 PROCEDURE — 63600175 PHARM REV CODE 636 W HCPCS: Performed by: NURSE ANESTHETIST, CERTIFIED REGISTERED

## 2024-01-02 PROCEDURE — 71000015 HC POSTOP RECOV 1ST HR: Performed by: ORTHOPAEDIC SURGERY

## 2024-01-02 DEVICE — SCREW CAN 4.0MMX45MM.: Type: IMPLANTABLE DEVICE | Site: ANKLE | Status: FUNCTIONAL

## 2024-01-02 DEVICE — IMPLANTABLE DEVICE: Type: IMPLANTABLE DEVICE | Site: ANKLE | Status: FUNCTIONAL

## 2024-01-02 DEVICE — SCREW CORTEX 2.7 X 16.: Type: IMPLANTABLE DEVICE | Site: TIBIA | Status: FUNCTIONAL

## 2024-01-02 DEVICE — SCREW LOCKING BONE 2.7X20MM: Type: IMPLANTABLE DEVICE | Site: ANKLE | Status: FUNCTIONAL

## 2024-01-02 DEVICE — SCREW BONE LOCK VA 2.7X30MM: Type: IMPLANTABLE DEVICE | Site: TIBIA | Status: FUNCTIONAL

## 2024-01-02 DEVICE — SCREW BONE LOCK VA 2.7X50MM: Type: IMPLANTABLE DEVICE | Site: TIBIA | Status: FUNCTIONAL

## 2024-01-02 DEVICE — SCREW 2.7MM X 16 LOCKING ANGLE: Type: IMPLANTABLE DEVICE | Site: TIBIA | Status: FUNCTIONAL

## 2024-01-02 DEVICE — SCREW MTPHSEAL ST T8 2.7X16MM: Type: IMPLANTABLE DEVICE | Site: TIBIA | Status: FUNCTIONAL

## 2024-01-02 DEVICE — SCREW CORTEX 3.5 X 24: Type: IMPLANTABLE DEVICE | Site: ANKLE | Status: FUNCTIONAL

## 2024-01-02 DEVICE — SCREW STRDRV VA LOK 2.7X38: Type: IMPLANTABLE DEVICE | Site: TIBIA | Status: FUNCTIONAL

## 2024-01-02 DEVICE — SCREW 2.7MM X 14 LOCKING ANGLE: Type: IMPLANTABLE DEVICE | Site: ANKLE | Status: FUNCTIONAL

## 2024-01-02 DEVICE — SCREW CORTEX 3.5 X 26: Type: IMPLANTABLE DEVICE | Site: ANKLE | Status: FUNCTIONAL

## 2024-01-02 DEVICE — SCREW VA LOK ST T8 2.7X46MM: Type: IMPLANTABLE DEVICE | Site: TIBIA | Status: FUNCTIONAL

## 2024-01-02 DEVICE — IMPLANTABLE DEVICE: Type: IMPLANTABLE DEVICE | Site: TIBIA | Status: FUNCTIONAL

## 2024-01-02 DEVICE — SCREW BONE LOCK VA 2.7X40MM: Type: IMPLANTABLE DEVICE | Site: TIBIA | Status: FUNCTIONAL

## 2024-01-02 DEVICE — SCREW CORTEX 2.7 X 14.: Type: IMPLANTABLE DEVICE | Site: TIBIA | Status: FUNCTIONAL

## 2024-01-02 RX ORDER — ONDANSETRON 2 MG/ML
4 INJECTION INTRAMUSCULAR; INTRAVENOUS ONCE
Status: DISCONTINUED | OUTPATIENT
Start: 2024-01-02 | End: 2024-01-02 | Stop reason: HOSPADM

## 2024-01-02 RX ORDER — DOCUSATE SODIUM 100 MG/1
100 CAPSULE, LIQUID FILLED ORAL 2 TIMES DAILY
Status: DISCONTINUED | OUTPATIENT
Start: 2024-01-02 | End: 2024-01-04 | Stop reason: HOSPADM

## 2024-01-02 RX ORDER — MEPERIDINE HYDROCHLORIDE 25 MG/ML
12.5 INJECTION INTRAMUSCULAR; INTRAVENOUS; SUBCUTANEOUS ONCE
Status: COMPLETED | OUTPATIENT
Start: 2024-01-02 | End: 2024-01-02

## 2024-01-02 RX ORDER — GABAPENTIN 300 MG/1
300 CAPSULE ORAL 3 TIMES DAILY
Status: DISCONTINUED | OUTPATIENT
Start: 2024-01-02 | End: 2024-01-04 | Stop reason: HOSPADM

## 2024-01-02 RX ORDER — MIDAZOLAM HYDROCHLORIDE 1 MG/ML
INJECTION INTRAMUSCULAR; INTRAVENOUS
Status: DISCONTINUED | OUTPATIENT
Start: 2024-01-02 | End: 2024-01-02

## 2024-01-02 RX ORDER — SODIUM CHLORIDE, SODIUM LACTATE, POTASSIUM CHLORIDE, CALCIUM CHLORIDE 600; 310; 30; 20 MG/100ML; MG/100ML; MG/100ML; MG/100ML
INJECTION, SOLUTION INTRAVENOUS CONTINUOUS
Status: DISCONTINUED | OUTPATIENT
Start: 2024-01-02 | End: 2024-01-02

## 2024-01-02 RX ORDER — OXYCODONE HYDROCHLORIDE 5 MG/1
5 TABLET ORAL EVERY 4 HOURS PRN
Status: DISCONTINUED | OUTPATIENT
Start: 2024-01-02 | End: 2024-01-04 | Stop reason: HOSPADM

## 2024-01-02 RX ORDER — LORAZEPAM 1 MG/1
1 TABLET ORAL EVERY 4 HOURS PRN
Status: DISCONTINUED | OUTPATIENT
Start: 2024-01-02 | End: 2024-01-04 | Stop reason: HOSPADM

## 2024-01-02 RX ORDER — VANCOMYCIN HYDROCHLORIDE 1 G/20ML
INJECTION, POWDER, LYOPHILIZED, FOR SOLUTION INTRAVENOUS
Status: DISCONTINUED | OUTPATIENT
Start: 2024-01-02 | End: 2024-01-02 | Stop reason: HOSPADM

## 2024-01-02 RX ORDER — PHENYLEPHRINE HCL IN 0.9% NACL 1 MG/10 ML
SYRINGE (ML) INTRAVENOUS
Status: DISCONTINUED | OUTPATIENT
Start: 2024-01-02 | End: 2024-01-02

## 2024-01-02 RX ORDER — POLYETHYLENE GLYCOL 3350 17 G/17G
17 POWDER, FOR SOLUTION ORAL 2 TIMES DAILY
Status: DISCONTINUED | OUTPATIENT
Start: 2024-01-02 | End: 2024-01-04 | Stop reason: HOSPADM

## 2024-01-02 RX ORDER — CEFAZOLIN SODIUM 2 G/50ML
2 SOLUTION INTRAVENOUS
Status: COMPLETED | OUTPATIENT
Start: 2024-01-02 | End: 2024-01-03

## 2024-01-02 RX ORDER — FOLIC ACID 1 MG/1
1 TABLET ORAL DAILY
Status: DISCONTINUED | OUTPATIENT
Start: 2024-01-02 | End: 2024-01-04 | Stop reason: HOSPADM

## 2024-01-02 RX ORDER — ROCURONIUM BROMIDE 10 MG/ML
INJECTION, SOLUTION INTRAVENOUS
Status: DISCONTINUED | OUTPATIENT
Start: 2024-01-02 | End: 2024-01-02

## 2024-01-02 RX ORDER — FENTANYL CITRATE 50 UG/ML
INJECTION, SOLUTION INTRAMUSCULAR; INTRAVENOUS
Status: DISCONTINUED | OUTPATIENT
Start: 2024-01-02 | End: 2024-01-02

## 2024-01-02 RX ORDER — FENTANYL CITRATE 50 UG/ML
50 INJECTION, SOLUTION INTRAMUSCULAR; INTRAVENOUS
Status: COMPLETED | OUTPATIENT
Start: 2024-01-02 | End: 2024-01-02

## 2024-01-02 RX ORDER — METHOCARBAMOL 500 MG/1
500 TABLET, FILM COATED ORAL EVERY 8 HOURS
Status: DISCONTINUED | OUTPATIENT
Start: 2024-01-02 | End: 2024-01-03

## 2024-01-02 RX ORDER — LIDOCAINE HYDROCHLORIDE 20 MG/ML
INJECTION INTRAVENOUS
Status: DISCONTINUED | OUTPATIENT
Start: 2024-01-02 | End: 2024-01-02

## 2024-01-02 RX ORDER — GLYCOPYRROLATE 0.2 MG/ML
INJECTION INTRAMUSCULAR; INTRAVENOUS
Status: DISCONTINUED | OUTPATIENT
Start: 2024-01-02 | End: 2024-01-02

## 2024-01-02 RX ORDER — THIAMINE HCL 100 MG
100 TABLET ORAL DAILY
Status: DISCONTINUED | OUTPATIENT
Start: 2024-01-02 | End: 2024-01-04 | Stop reason: HOSPADM

## 2024-01-02 RX ORDER — MIDAZOLAM HYDROCHLORIDE 1 MG/ML
2 INJECTION INTRAMUSCULAR; INTRAVENOUS
Status: DISCONTINUED | OUTPATIENT
Start: 2024-01-02 | End: 2024-01-02 | Stop reason: HOSPADM

## 2024-01-02 RX ORDER — ADHESIVE BANDAGE
30 BANDAGE TOPICAL DAILY PRN
Status: DISCONTINUED | OUTPATIENT
Start: 2024-01-02 | End: 2024-01-04 | Stop reason: HOSPADM

## 2024-01-02 RX ORDER — ACETAMINOPHEN 325 MG/1
650 TABLET ORAL EVERY 4 HOURS
Status: DISCONTINUED | OUTPATIENT
Start: 2024-01-02 | End: 2024-01-04 | Stop reason: HOSPADM

## 2024-01-02 RX ORDER — HYDROMORPHONE HYDROCHLORIDE 2 MG/ML
0.2 INJECTION, SOLUTION INTRAMUSCULAR; INTRAVENOUS; SUBCUTANEOUS EVERY 5 MIN PRN
Status: DISCONTINUED | OUTPATIENT
Start: 2024-01-02 | End: 2024-01-02 | Stop reason: HOSPADM

## 2024-01-02 RX ORDER — OXYCODONE HYDROCHLORIDE 10 MG/1
10 TABLET ORAL EVERY 4 HOURS PRN
Status: DISCONTINUED | OUTPATIENT
Start: 2024-01-02 | End: 2024-01-04 | Stop reason: HOSPADM

## 2024-01-02 RX ORDER — HYDROMORPHONE HYDROCHLORIDE 2 MG/ML
0.5 INJECTION, SOLUTION INTRAMUSCULAR; INTRAVENOUS; SUBCUTANEOUS EVERY 5 MIN PRN
Status: DISCONTINUED | OUTPATIENT
Start: 2024-01-02 | End: 2024-01-02 | Stop reason: HOSPADM

## 2024-01-02 RX ORDER — PROPOFOL 10 MG/ML
VIAL (ML) INTRAVENOUS
Status: DISCONTINUED | OUTPATIENT
Start: 2024-01-02 | End: 2024-01-02

## 2024-01-02 RX ORDER — ONDANSETRON 2 MG/ML
4 INJECTION INTRAMUSCULAR; INTRAVENOUS
Status: COMPLETED | OUTPATIENT
Start: 2024-01-02 | End: 2024-01-02

## 2024-01-02 RX ORDER — CEFAZOLIN SODIUM 2 G/50ML
2 SOLUTION INTRAVENOUS ONCE
Status: DISCONTINUED | OUTPATIENT
Start: 2024-01-02 | End: 2024-01-03

## 2024-01-02 RX ORDER — DEXAMETHASONE SODIUM PHOSPHATE 4 MG/ML
INJECTION, SOLUTION INTRA-ARTICULAR; INTRALESIONAL; INTRAMUSCULAR; INTRAVENOUS; SOFT TISSUE
Status: DISCONTINUED | OUTPATIENT
Start: 2024-01-02 | End: 2024-01-02

## 2024-01-02 RX ORDER — ENOXAPARIN SODIUM 100 MG/ML
40 INJECTION SUBCUTANEOUS EVERY 12 HOURS
Status: DISCONTINUED | OUTPATIENT
Start: 2024-01-02 | End: 2024-01-04 | Stop reason: HOSPADM

## 2024-01-02 RX ORDER — KETOROLAC TROMETHAMINE 30 MG/ML
15 INJECTION, SOLUTION INTRAMUSCULAR; INTRAVENOUS EVERY 6 HOURS
Status: DISCONTINUED | OUTPATIENT
Start: 2024-01-02 | End: 2024-01-04 | Stop reason: HOSPADM

## 2024-01-02 RX ORDER — ONDANSETRON 2 MG/ML
INJECTION INTRAMUSCULAR; INTRAVENOUS
Status: DISCONTINUED | OUTPATIENT
Start: 2024-01-02 | End: 2024-01-02

## 2024-01-02 RX ORDER — METHOCARBAMOL 100 MG/ML
1000 INJECTION, SOLUTION INTRAMUSCULAR; INTRAVENOUS ONCE
Status: COMPLETED | OUTPATIENT
Start: 2024-01-02 | End: 2024-01-02

## 2024-01-02 RX ORDER — EPHEDRINE SULFATE 50 MG/ML
INJECTION, SOLUTION INTRAVENOUS
Status: DISCONTINUED | OUTPATIENT
Start: 2024-01-02 | End: 2024-01-02

## 2024-01-02 RX ORDER — MORPHINE SULFATE 4 MG/ML
4 INJECTION, SOLUTION INTRAMUSCULAR; INTRAVENOUS
Status: COMPLETED | OUTPATIENT
Start: 2024-01-02 | End: 2024-01-02

## 2024-01-02 RX ORDER — TALC
6 POWDER (GRAM) TOPICAL NIGHTLY PRN
Status: DISCONTINUED | OUTPATIENT
Start: 2024-01-02 | End: 2024-01-04 | Stop reason: HOSPADM

## 2024-01-02 RX ORDER — KETAMINE HYDROCHLORIDE 100 MG/ML
INJECTION, SOLUTION INTRAMUSCULAR; INTRAVENOUS
Status: DISCONTINUED | OUTPATIENT
Start: 2024-01-02 | End: 2024-01-02

## 2024-01-02 RX ORDER — ROPIVACAINE HYDROCHLORIDE 5 MG/ML
30 INJECTION, SOLUTION EPIDURAL; INFILTRATION; PERINEURAL ONCE
Status: DISCONTINUED | OUTPATIENT
Start: 2024-01-02 | End: 2024-01-02 | Stop reason: HOSPADM

## 2024-01-02 RX ORDER — DIPHENHYDRAMINE HYDROCHLORIDE 50 MG/ML
25 INJECTION, SOLUTION INTRAMUSCULAR; INTRAVENOUS EVERY 6 HOURS PRN
Status: DISCONTINUED | OUTPATIENT
Start: 2024-01-02 | End: 2024-01-02 | Stop reason: HOSPADM

## 2024-01-02 RX ADMIN — EPHEDRINE SULFATE 10 MG: 50 INJECTION INTRAVENOUS at 12:01

## 2024-01-02 RX ADMIN — HYDROMORPHONE HYDROCHLORIDE 0.5 MG: 2 INJECTION INTRAMUSCULAR; INTRAVENOUS; SUBCUTANEOUS at 03:01

## 2024-01-02 RX ADMIN — ACETAMINOPHEN 650 MG: 325 TABLET, FILM COATED ORAL at 09:01

## 2024-01-02 RX ADMIN — GABAPENTIN 300 MG: 300 CAPSULE ORAL at 09:01

## 2024-01-02 RX ADMIN — Medication 100 MCG: at 12:01

## 2024-01-02 RX ADMIN — MIDAZOLAM HYDROCHLORIDE 2 MG: 1 INJECTION, SOLUTION INTRAMUSCULAR; INTRAVENOUS at 12:01

## 2024-01-02 RX ADMIN — THERA TABS 1 TABLET: TAB at 04:01

## 2024-01-02 RX ADMIN — THIAMINE HCL TAB 100 MG 100 MG: 100 TAB at 04:01

## 2024-01-02 RX ADMIN — GLYCOPYRROLATE 0.1 MG: 0.2 INJECTION INTRAMUSCULAR; INTRAVENOUS at 12:01

## 2024-01-02 RX ADMIN — CEFAZOLIN SODIUM 2 G: 2 SOLUTION INTRAVENOUS at 06:01

## 2024-01-02 RX ADMIN — METHOCARBAMOL 1000 MG: 100 INJECTION INTRAMUSCULAR; INTRAVENOUS at 02:01

## 2024-01-02 RX ADMIN — FOLIC ACID 1 MG: 1 TABLET ORAL at 04:01

## 2024-01-02 RX ADMIN — EPHEDRINE SULFATE 5 MG: 50 INJECTION INTRAVENOUS at 02:01

## 2024-01-02 RX ADMIN — HYDROMORPHONE HYDROCHLORIDE 0.5 MG: 2 INJECTION INTRAMUSCULAR; INTRAVENOUS; SUBCUTANEOUS at 02:01

## 2024-01-02 RX ADMIN — FENTANYL CITRATE 50 MCG: 50 INJECTION, SOLUTION INTRAMUSCULAR; INTRAVENOUS at 09:01

## 2024-01-02 RX ADMIN — MELATONIN TAB 3 MG 6 MG: 3 TAB at 09:01

## 2024-01-02 RX ADMIN — DEXAMETHASONE SODIUM PHOSPHATE 4 MG: 4 INJECTION, SOLUTION INTRA-ARTICULAR; INTRALESIONAL; INTRAMUSCULAR; INTRAVENOUS; SOFT TISSUE at 12:01

## 2024-01-02 RX ADMIN — FENTANYL CITRATE 100 MCG: 50 INJECTION, SOLUTION INTRAMUSCULAR; INTRAVENOUS at 12:01

## 2024-01-02 RX ADMIN — SUGAMMADEX 100 MG: 100 INJECTION, SOLUTION INTRAVENOUS at 02:01

## 2024-01-02 RX ADMIN — OXYCODONE HYDROCHLORIDE 10 MG: 5 TABLET ORAL at 09:01

## 2024-01-02 RX ADMIN — THIAMINE HYDROCHLORIDE: 100 INJECTION, SOLUTION INTRAMUSCULAR; INTRAVENOUS at 03:01

## 2024-01-02 RX ADMIN — FENTANYL CITRATE 50 MCG: 50 INJECTION, SOLUTION INTRAMUSCULAR; INTRAVENOUS at 02:01

## 2024-01-02 RX ADMIN — KETOROLAC TROMETHAMINE 15 MG: 30 INJECTION, SOLUTION INTRAMUSCULAR; INTRAVENOUS at 06:01

## 2024-01-02 RX ADMIN — ROCURONIUM BROMIDE 50 MG: 10 SOLUTION INTRAVENOUS at 12:01

## 2024-01-02 RX ADMIN — MEPERIDINE HYDROCHLORIDE 12.5 MG: 25 INJECTION INTRAMUSCULAR; INTRAVENOUS; SUBCUTANEOUS at 03:01

## 2024-01-02 RX ADMIN — MORPHINE SULFATE 4 MG: 4 INJECTION, SOLUTION INTRAMUSCULAR; INTRAVENOUS at 08:01

## 2024-01-02 RX ADMIN — KETAMINE HYDROCHLORIDE 10 MG: 100 INJECTION, SOLUTION, CONCENTRATE INTRAMUSCULAR; INTRAVENOUS at 01:01

## 2024-01-02 RX ADMIN — SODIUM CHLORIDE, SODIUM GLUCONATE, SODIUM ACETATE, POTASSIUM CHLORIDE AND MAGNESIUM CHLORIDE: 526; 502; 368; 37; 30 INJECTION, SOLUTION INTRAVENOUS at 12:01

## 2024-01-02 RX ADMIN — KETAMINE HYDROCHLORIDE 10 MG: 100 INJECTION, SOLUTION, CONCENTRATE INTRAMUSCULAR; INTRAVENOUS at 12:01

## 2024-01-02 RX ADMIN — MIDAZOLAM 2 MG: 1 INJECTION INTRAMUSCULAR; INTRAVENOUS at 11:01

## 2024-01-02 RX ADMIN — ENOXAPARIN SODIUM 40 MG: 100 INJECTION SUBCUTANEOUS at 09:01

## 2024-01-02 RX ADMIN — LIDOCAINE HYDROCHLORIDE 80 MG: 20 INJECTION INTRAVENOUS at 12:01

## 2024-01-02 RX ADMIN — ONDANSETRON 4 MG: 2 INJECTION INTRAMUSCULAR; INTRAVENOUS at 08:01

## 2024-01-02 RX ADMIN — ONDANSETRON 4 MG: 2 INJECTION INTRAMUSCULAR; INTRAVENOUS at 12:01

## 2024-01-02 RX ADMIN — SODIUM CHLORIDE, POTASSIUM CHLORIDE, SODIUM LACTATE AND CALCIUM CHLORIDE 1000 ML: 600; 310; 30; 20 INJECTION, SOLUTION INTRAVENOUS at 08:01

## 2024-01-02 RX ADMIN — GABAPENTIN 300 MG: 300 CAPSULE ORAL at 03:01

## 2024-01-02 RX ADMIN — LIDOCAINE HYDROCHLORIDE 100 MG: 20 INJECTION INTRAVENOUS at 02:01

## 2024-01-02 RX ADMIN — OXYCODONE HYDROCHLORIDE 10 MG: 5 TABLET ORAL at 04:01

## 2024-01-02 RX ADMIN — KETAMINE HYDROCHLORIDE 10 MG: 100 INJECTION, SOLUTION, CONCENTRATE INTRAMUSCULAR; INTRAVENOUS at 02:01

## 2024-01-02 RX ADMIN — PROPOFOL 140 MG: 10 INJECTION, EMULSION INTRAVENOUS at 12:01

## 2024-01-02 RX ADMIN — PROPOFOL 20 MG: 10 INJECTION, EMULSION INTRAVENOUS at 02:01

## 2024-01-02 RX ADMIN — PROPOFOL 60 MG: 10 INJECTION, EMULSION INTRAVENOUS at 01:01

## 2024-01-02 RX ADMIN — KETAMINE HYDROCHLORIDE 20 MG: 100 INJECTION, SOLUTION, CONCENTRATE INTRAMUSCULAR; INTRAVENOUS at 12:01

## 2024-01-02 RX ADMIN — DOCUSATE SODIUM 100 MG: 100 CAPSULE, LIQUID FILLED ORAL at 09:01

## 2024-01-02 RX ADMIN — METHOCARBAMOL 500 MG: 500 TABLET ORAL at 09:01

## 2024-01-02 NOTE — ANESTHESIA PREPROCEDURE EVALUATION
01/02/2024  Nazia Rodríguez is a 68 y.o., female.  Distal tib-fib with intraarticular extension      6/2023: status post TEVAR for small type B aortic dissection/intimal flap.       Pre-op Assessment    I have reviewed the Patient Summary Reports.     I have reviewed the Nursing Notes. I have reviewed the NPO Status.   I have reviewed the Medications.     Review of Systems  Anesthesia Hx:  No problems with previous Anesthesia                Social:  Non-Smoker       Cardiovascular:        CAD              ECG has been reviewed.    Coronary Artery Disease:                                  Pulmonary:   COPD         Chronic Obstructive Pulmonary Disease (COPD):                      Psych:  Psychiatric History                  Physical Exam  General: Well nourished, Cooperative, Alert and Oriented    Airway:  Mallampati: II   Mouth Opening: Normal  TM Distance: Normal  Tongue: Normal  Neck ROM: Normal ROM    Dental:  Intact  Has loose temp cap to lower premolar      Anesthesia Plan  Type of Anesthesia, risks & benefits discussed:    Anesthesia Type: Gen ETT  Intra-op Monitoring Plan: Standard ASA Monitors  Post Op Pain Control Plan: multimodal analgesia and peripheral nerve block  Induction:  IV  Airway Plan: Direct, Post-Induction  Informed Consent: Informed consent signed with the Patient and all parties understand the risks and agree with anesthesia plan.  All questions answered. Patient consented to blood products? Yes  ASA Score: 3  Day of Surgery Review of History & Physical: H&P Update referred to the surgeon/provider.    Ready For Surgery From Anesthesia Perspective.     .

## 2024-01-02 NOTE — TRANSFER OF CARE
"Anesthesia Transfer of Care Note    Patient: Nazia Rodríguez    Procedure(s) Performed: Procedure(s) (LRB):  ORIF, FRACTURE, PILON (Right)    Patient location: PACU    Anesthesia Type: general    Transport from OR: Transported from OR on room air with adequate spontaneous ventilation    Post pain: adequate analgesia    Post assessment: no apparent anesthetic complications and tolerated procedure well    Post vital signs: stable    Level of consciousness: awake, alert and oriented    Nausea/Vomiting: no nausea/vomiting    Complications: none    Transfer of care protocol was followed      Last vitals: Visit Vitals  /68   Pulse 76   Temp 36.6 °C (97.9 °F) (Axillary)   Resp 20   Ht 5' 4" (1.626 m)   SpO2 99%   BMI 23.69 kg/m²     "

## 2024-01-02 NOTE — ED PROVIDER NOTES
Encounter Date: 2024       History     Chief Complaint   Patient presents with    Ankle Pain     C/o R ankle pain after trip and fall. -LOC. -BT. PMS intact. Splint in place from EMS.     Nazia Rodríguez is a 68 y.o. female with a past medical history of anxiety, HTN who presents to the ED for right ankle pain after a trip and fall at home.   She denies any head trauma or loss of consciousness.  She denies any chest pain or shortness of breath.  She denies any neck, back, abdominal pain.         Review of patient's allergies indicates:  No Known Allergies  Past Medical History:   Diagnosis Date    Anxiety disorder, unspecified     Claustrophobia     Depression     Descending thoracic aortic dissection     High cholesterol     Insomnia     Mitral valve prolapse     Obesity, unspecified     Personal history of colonic polyps      Past Surgical History:   Procedure Laterality Date    CATARACT EXTRACTION       SECTION      HYSTERECTOMY      NECK SURGERY      OPEN REDUCTION AND INTERNAL FIXATION (ORIF) OF PILON FRACTURE Right 2024    Procedure: ORIF, FRACTURE, PILON;  Surgeon: Zoran Sanchez MD;  Location: [facility];  Service: Orthopedics;  Laterality: Right;  EX-FIX -VS- ORIF RIGHT ANKLE //  SPINE // VASC // BONE FOAM // SYNTHES     Family History   Problem Relation Age of Onset    No Known Problems Mother     No Known Problems Father     Heart attack Neg Hx     Fainting Neg Hx     Heart disease Neg Hx     Heart failure Neg Hx     Hyperlipidemia Neg Hx     Hypertension Neg Hx     Stroke Neg Hx     Atrial Septal Defect Neg Hx      Social History     Tobacco Use    Smoking status: Former     Current packs/day: 1.50     Average packs/day: 1.5 packs/day for 30.0 years (45.0 ttl pk-yrs)     Types: Cigarettes    Smokeless tobacco: Never    Tobacco comments:     Quit 20 years ago   Substance Use Topics    Drug use: Never     Review of Systems   Constitutional:  Negative for chills and fever.   HENT:   Negative for congestion, drooling and sore throat.    Eyes:  Negative for pain and visual disturbance.   Respiratory:  Negative for chest tightness, shortness of breath and wheezing.    Cardiovascular:  Negative for chest pain, palpitations and leg swelling.   Gastrointestinal:  Negative for abdominal pain, nausea and vomiting.   Genitourinary:  Negative for dysuria and hematuria.   Musculoskeletal:  Negative for back pain, neck pain and neck stiffness.          Ankle pain   Skin:  Negative for pallor and rash.   Neurological:  Negative for weakness and numbness.   Hematological:  Does not bruise/bleed easily.       Physical Exam     Initial Vitals [01/02/24 0747]   BP Pulse Resp Temp SpO2   (!) 149/79 79 18 97.9 °F (36.6 °C) 99 %      MAP       --         Physical Exam    Nursing note and vitals reviewed.  Constitutional: She appears well-developed and well-nourished. She is not diaphoretic. No distress.   HENT:   Head: Normocephalic and atraumatic.   Nose: Nose normal.   Mouth/Throat: Oropharynx is clear and moist.   Eyes: EOM are normal. Pupils are equal, round, and reactive to light.   Neck: Neck supple.   Normal range of motion.  Cardiovascular:  Normal rate and regular rhythm.           No murmur heard.  Pulmonary/Chest: Breath sounds normal. No respiratory distress. She has no wheezes. She has no rales.   Abdominal: Abdomen is soft. She exhibits no distension. There is no abdominal tenderness.   Musculoskeletal:         General: Tenderness present.      Cervical back: Normal range of motion and neck supple.      Comments:  Right ankle with Roland splint in place, deformity noted, pulse intact, sensation intact     Neurological: She is alert and oriented to person, place, and time. She has normal strength. No cranial nerve deficit or sensory deficit.   Skin: Skin is warm. Capillary refill takes less than 2 seconds. No rash noted.         ED Course   Orthopedic Injury    Date/Time: 1/2/2024 9:30 AM    Performed  by: Jonathan Chahal MD  Authorized by: Jonathan Chahal MD    Location procedure was performed:  [facility] EMERGENCY DEPARTMENT  Pre-operative diagnosis:  Fracture  Post-operative diagnosis:  Fracture  Consent Done?:  Yes  Universal Protocol:     Verbal consent obtained?: Yes      Risks and benefits: Risks, benefits and alternatives were discussed      Consent given by:  Patient    Patient identity confirmed:  , name, verbally with patient and MRN    Time Out: Immediately prior to the procedure a time out was called    Injury:     Injury location:  Ankle    Location details:  Right ankle    Injury type:  Fracture    Fracture type: tibial plafond      Fracture type: tibial plafond        Pre-procedure assessment:     Distal perfusion: normal      Neurological function: normal      Range of motion: reduced      Patient sedated?: No        Selections made in this section will also lock the Injury type section above.:     Manipulation performed?: No      Immobilization:  Splint    Splint type:  Ankle stirrup    Supplies used:  Ortho-Glass    Complications: No    Post-procedure assessment:     Neurovascular status: Neurovascularly intact      Distal perfusion: normal      Neurological function: normal      Range of motion: splinted      Patient tolerance:  Patient tolerated the procedure well with no immediate complications    Labs Reviewed   CBC WITH DIFFERENTIAL - Abnormal; Notable for the following components:       Result Value    WBC 4.39 (*)     MCV 94.5 (*)     MCH 31.4 (*)     All other components within normal limits   PROTIME-INR - Abnormal; Notable for the following components:    PT 11.8 (*)     All other components within normal limits   CBC W/ AUTO DIFFERENTIAL    Narrative:     The following orders were created for panel order CBC auto differential.  Procedure                               Abnormality         Status                     ---------                               -----------          ------                     CBC with Differential[[id number]]       Abnormal            Final result                 Please view results for these tests on the individual orders.        ECG Results              EKG 12-lead (Final result)  Result time 01/02/24 16:28:32      Final result by Aaron Bishop [facility] (01/02/24 16:28:32)                   Narrative:    Test Reason : Z01.818,    Vent. Rate : 053 BPM     Atrial Rate : 053 BPM     P-R Int : 190 ms          QRS Dur : 082 ms      QT Int : 416 ms       P-R-T Axes : 081 -28 033 degrees     QTc Int : 390 ms    Sinus bradycardia  Otherwise normal ECG  When compared with ECG of 17-JUN-2022 13:19,  No significant change was found  Confirmed by Tariq Mendez MD ([id number]) on 1/2/2024 2:36:09 PM    Referred By: MARTINA REIS II           Confirmed By:Tariq Mendez MD                                  Imaging Results              CT 3D RECON WITHOUT INDEPENDENT WS (Final result)  Result time 01/02/24 10:10:46      Final result by Stuart Reyes MD (01/02/24 10:10:46)                   Impression:    FINDINGS/  3D reconstructions of the right ankle were created based on source data from accession number [id number].  Please see that report for details.      Electronically signed by: Stuart Reyes  Date:    01/02/2024  Time:    10:10               Narrative:    EXAMINATION:  CT 3D WITHOUT INDEPENDENT WS    CLINICAL HISTORY:  right ankle surgical planning;                                       CT Ankle (Including Hindfoot) Without Contrast Right (Final result)  Result time 01/02/24 09:53:44      Final result by Stuart Reyes MD (01/02/24 09:53:44)                   Impression:      Fractures of the distal tibia and fibula as discussed.      Electronically signed by: Stuart Reyes  Date:    01/02/2024  Time:    09:53               Narrative:    EXAMINATION:  CT ANKLE (INCLUDING HINDFOOT) WITHOUT CONTRAST RIGHT    CLINICAL HISTORY:  Fracture, ankle;    TECHNIQUE:  Helical  acquisition through the right ankle without IV contrast.  Three plane reconstructions were provided for review. DLP 41 mGycm. Automatic exposure control, adjustment of mA/kV or iterative reconstruction technique was used to reduce radiation.    COMPARISON:  Radiographs earlier today    FINDINGS:  There is comminuted intra-articular fracture of the distal tibia extending through the posterior malleolus.  Ossification off the tip of the medial malleolus is likely a chronic finding.  There is also comminuted fracture of the distal fibular shaft.  The ankle mortises are not significantly widened.  Talus and calcaneus are intact.                                       X-Ray Tibia Fibula 2 View Right (Final result)  Result time 01/02/24 08:41:51      Final result by Richie Sarah MD (01/02/24 08:41:51)                   Impression:      As above.      Electronically signed by: Richie Sarah  Date:    01/02/2024  Time:    08:41               Narrative:    EXAMINATION:  XR TIBIA FIBULA 2 VIEW RIGHT    CLINICAL HISTORY:  Unspecified fall, initial encounter    TECHNIQUE:  Two views of the right tibia and fibula.    COMPARISON:  No prior imaging available for comparison    FINDINGS:  Highly comminuted fracture of the distal tibia and fibula with brace material in place.  Proximal fibula appears intact.                                       X-Ray Ankle Complete Right (Final result)  Result time 01/02/24 08:27:44      Final result by Orlando Guerrero MD (01/02/24 08:27:44)                   Impression:      Fractures as above.      Electronically signed by: Orlando Guerrero  Date:    01/02/2024  Time:    08:27               Narrative:    EXAMINATION:  XR ANKLE COMPLETE 3 VIEW RIGHT    CLINICAL HISTORY:  Injury, unspecified, initial encounter    COMPARISON:  None.    FINDINGS:  Comminuted displaced fracture involving the distal tibia and distal fibula with the small avulsion of the tip of the medial malleolus no other  definite fractures or dislocations identified    Joint spaces preserved.    No blastic or lytic lesions.    Soft tissues within normal limits.                                       Medications   morphine injection 4 mg (4 mg Intravenous Given 1/2/24 0821)   ondansetron injection 4 mg (4 mg Intravenous Given 1/2/24 0821)   lactated ringers bolus 1,000 mL (0 mLs Intravenous Stopped 1/2/24 0921)   fentaNYL injection 50 mcg (50 mcg Intravenous Given 1/2/24 0930)   meperidine (PF) injection 12.5 mg (12.5 mg Intravenous Given by Other 1/2/24 1514)   cefazolin (ANCEF) 2 gram in dextrose 5% 50 mL IVPB (premix) (0 g Intravenous Stopped 1/3/24 1019)   sodium chloride 0.9% 1,000 mL with mvi, (ADULT) no.4 with vit K 3,300 unit- 150 mcg/10 mL 10 mL, thiamine 100 mg, folic acid 1 mg infusion ( Intravenous New Bag 1/2/24 1515)   methocarbamoL injection 1,000 mg (1,000 mg Intravenous Given 1/2/24 1459)     Medical Decision Making  Problems Addressed:  Acute right ankle pain: acute illness or injury  Closed fracture of right tibia and fibula, initial encounter: acute illness or injury  Fall: acute illness or injury  Trauma: acute illness or injury    Amount and/or Complexity of Data Reviewed  Labs: ordered.  Radiology: ordered. Decision-making details documented in ED Course.    Risk  Prescription drug management.  Decision regarding hospitalization.      Differential diagnosis (includes but is not limited to):   Fracture, dislocation, compartment syndrome, neurovascular injury, soft tissue injury, ligamentous injury, tendon injury    MDM Narrative  68-year-old female presents for evaluation of right ankle pain after a trip and fall.  Patient with a Roland's splint placed by EMS prior to arrival.  Pulses intact, sensation is intact.  Deformity noted, I do suspect fracture.  X-rays pending.  Pain and nausea control as needed.  Labs pending.  Case to be discussed with Orthopedic surgery once x-rays available.    Update:  X-rays confirmed  fracture.  Case discussed with Orthopedic surgery, recommends admission to the trauma service with plan for fixation either later today or 1st thing tomorrow morning.  Patient is splinted, see separate procedure note.  CT scan pending for operative planning.  Labs reviewed.  Imaging reviewed.  Case discussed with Trauma surgery, will admit.    Dispo:  Admit    My independent radiology interpretation: as above  Point of care US (independently performed and interpreted):   Decision rules/clinical scoring:     Sepsis Perfusion Assessment:     Amount and/or Complexity of Data Reviewed  Independent historian: EMS   Summary of history:  Report received from EMS on arrival  External data reviewed: notes from previous admissions, notes from previous ED visits, and notes from clinic visits  Summary of data reviewed: Prior records reviewed  Risk and benefits of testing: discussed   Labs: ordered and reviewed  Radiology: ordered and independent interpretation performed (see above or ED course)  ECG/medicine tests: ordered and independent interpretation performed (see above or ED course)  Discussion of management or test interpretation with external provider(s): discussed with Orthopedic surgery, Trauma surgery consultant   Summary of discussion: as above    Risk  Parenteral controlled substances   Drug therapy requiring intense monitoring for toxicity   Decision regarding hospitalization  Shared decision making     Critical Care  none    Data Reviewed/Counseling: I have personally reviewed the patient's vital signs, nursing notes, and other relevant tests, information, and imaging. I had a detailed discussion regarding the historical points, exam findings, and any diagnostic results supporting the discharge diagnosis. I personally performed the history, PE, MDM and procedures as documented above and agree with the scribe's documentation.    Portions of this note were dictated using voice recognition software. Although it was  reviewed for accuracy, some inherent voice recognition errors may have occurred and may be present in this document.             ED Course as of 01/15/24 1248   Tue Jan 02, 2024   0800 X-Ray Tibia Fibula 2 View Right  Independently visualized/reviewed by me during the ED visit.  - Comminuted and mildly displaced right tibia and fibular fractures [MC]   0800 X-Ray Ankle Complete Right  Independently visualized/reviewed by me during the ED visit.  - Comminuted and mildly displaced right tibia and fibular fractures [MC]   0834  Case discussed with Orthopedic surgery, recommends keeping her NPO , CT of the ankle, and they will try to get her fixed today.  Will place her in a short-leg splint with stirrup. []   0853  Trauma will admit []   0854   Patient reports she has been NPO since midnight with her last meal yesterday evening. []      ED Course User Index  [MC] Jonathan Chahal MD                           Clinical Impression:  Final diagnoses:  [T14.90XA] Trauma  [W19.XXXA] Fall  [M25.571] Acute right ankle pain (Primary)  [S82.201A, S82.401A] Closed fracture of right tibia and fibula, initial encounter          ED Disposition Condition    Admit Stable                Jonathan Chahal MD  01/15/24 1251

## 2024-01-02 NOTE — H&P
[facility] - Periop Services  Trauma  History & Physical    Patient Name: Nazia Rodríguez  MRN: [id number]  Admission Date: 1/2/2024  Attending Physician: Royal Garrett MD   Primary Care Provider: Casimiro Kat II, MD    Patient information was obtained from patient and ER records.     Subjective:     Chief Complaint/Reason for Admission:  Fall    History of Present Illness: 68-year-old female by report tripped and fell and was found to have a tib-fib fracture.  Known to Dr. Sanchez from a previous trauma approximately 2 years ago where she suffered head bleed, cervical fractures, aortic dissection requiring intervention.  Lead tib-fib fractures of the right.  She was finishing her operative course with Orthopedics at this time and she has been evaluated by our team preoperatively.  She was GCS 15.  No other injuries were found.  No other history is known other than surgical history of hysterectomy in the past.  Hemodynamically stable.      No current facility-administered medications on file prior to encounter.     Current Outpatient Medications on File Prior to Encounter   Medication Sig    ALPRAZolam (XANAX) 0.5 MG tablet TAKE ONE TABLET ONCE DAILY AS NEEDED FOR ANXIETY    amitriptyline (ELAVIL) 25 MG tablet Take 1 tablet (25 mg total) by mouth nightly as needed for Insomnia.    aspirin (ECOTRIN) 81 MG EC tablet Take 81 mg by mouth once daily.    buPROPion (WELLBUTRIN XL) 150 MG TB24 tablet Take 1 tablet (150 mg total) by mouth once daily.    naltrexone (DEPADE) 50 mg tablet TAKE ONE TABLET BY MOUTH ONCE DAILY AND AS NEEDED FOR craving. maximum OF 2 TABLETS PER DAY.    nystatin (MYCOSTATIN) powder Apply topically 4 (four) times daily.    rosuvastatin (CRESTOR) 5 MG tablet Take 1 tablet (5 mg total) by mouth once daily.    triazolam (HALCION) 0.25 MG Tab Take 1 tablet (0.25 mg total) by mouth nightly as needed (insomnia). FOR INSOMNIA    triazolam (HALCION) 0.25 MG Tab TAKE ONE TABLET BY  MOUTH NIGHTLY AS NEEDED FOR INSOMNIA       Review of patient's allergies indicates:  No Known Allergies    Past Medical History:   Diagnosis Date    Anxiety disorder, unspecified     Claustrophobia     Depression     Descending thoracic aortic dissection     High cholesterol     Insomnia     Mitral valve prolapse     Obesity, unspecified     Personal history of colonic polyps      Past Surgical History:   Procedure Laterality Date    CATARACT EXTRACTION       SECTION      HYSTERECTOMY      NECK SURGERY       Family History       Problem Relation (Age of Onset)    No Known Problems Mother, Father          Tobacco Use    Smoking status: Former     Current packs/day: 1.50     Average packs/day: 1.5 packs/day for 30.0 years (45.0 ttl pk-yrs)     Types: Cigarettes    Smokeless tobacco: Never    Tobacco comments:     Quit 20 years ago   Substance and Sexual Activity    Alcohol use: Not on file    Drug use: Never    Sexual activity: Never     Review of Systems   Constitutional:  Negative for chills and fever.   HENT:  Negative for ear pain and trouble swallowing.    Eyes:  Negative for pain and redness.   Respiratory:  Negative for cough and chest tightness.    Cardiovascular:  Negative for chest pain, palpitations and leg swelling.   Gastrointestinal:  Negative for abdominal distention, abdominal pain, nausea and vomiting.   Genitourinary:  Negative for difficulty urinating.   Musculoskeletal:  Negative for back pain and neck pain.        Right tib-fib fracture   Skin:  Negative for color change, pallor and wound.   Neurological:  Negative for dizziness, syncope, speech difficulty, weakness, light-headedness, numbness and headaches.   Psychiatric/Behavioral:  Negative for agitation and suicidal ideas.    All other systems reviewed and are negative.    Objective:     Vital Signs (Most Recent):  Temp: 97.9 °F (36.6 °C) (24 0747)  Pulse: (!) 58 (24 1033)  Resp: 18 (24 0930)  BP: (!) 144/75 (24  1033)  SpO2: 98 % (01/02/24 1033) Vital Signs (24h Range):  Temp:  [97.9 °F (36.6 °C)] 97.9 °F (36.6 °C)  Pulse:  [58-79] 58  Resp:  [18] 18  SpO2:  [98 %-99 %] 98 %  BP: (144-149)/(75-79) 144/75        Body mass index is 23.69 kg/m².     Physical Exam  Constitutional:       Appearance: Normal appearance.   HENT:      Head: Normocephalic and atraumatic.      Nose: Nose normal.   Eyes:      Pupils: Pupils are equal, round, and reactive to light.   Cardiovascular:      Rate and Rhythm: Normal rate.      Pulses: Normal pulses.      Comments: Normal peripheral pulses  Pulmonary:      Effort: Pulmonary effort is normal. No respiratory distress.   Chest:      Chest wall: No tenderness.   Abdominal:      General: Abdomen is flat. Bowel sounds are normal. There is no distension.      Palpations: Abdomen is soft.      Tenderness: There is no abdominal tenderness.   Musculoskeletal:         General: Swelling, tenderness and signs of injury present. No deformity.      Cervical back: Normal range of motion and neck supple. No tenderness.   Skin:     General: Skin is warm and dry.      Capillary Refill: Capillary refill takes less than 2 seconds.      Findings: No lesion.   Neurological:      General: No focal deficit present.      Mental Status: She is alert and oriented to person, place, and time. Mental status is at baseline.   Psychiatric:         Mood and Affect: Mood normal.         Behavior: Behavior normal.         Thought Content: Thought content normal.         Judgment: Judgment normal.            I have reviewed all pertinent lab results within the past 24 hours.    Significant Diagnostics:  I have reviewed all pertinent imaging results/findings within the past 24 hours.    Assessment/Plan:     Closed fracture of right tibia and fibula  Lovenox 40 b.i.d.   CIWA protocol   Regular diet postop   IV fluids can stop postop  Weightbearing per Orthopedics   PTOT  Likely discharge in the next 24 hours   May need home health  or outpatient therapy   Multimodal pain control      VTE Risk Mitigation (From admission, onward)           Ordered     enoxaparin injection 40 mg  Every 12 hours         01/02/24 1053     IP VTE HIGH RISK PATIENT  Once         01/02/24 1053     Place sequential compression device  Until discontinued         01/02/24 1053                    BENEDICTO Hernandez  Trauma  [facility] - Periop Services

## 2024-01-02 NOTE — SUBJECTIVE & OBJECTIVE
No current facility-administered medications on file prior to encounter.     Current Outpatient Medications on File Prior to Encounter   Medication Sig    ALPRAZolam (XANAX) 0.5 MG tablet TAKE ONE TABLET ONCE DAILY AS NEEDED FOR ANXIETY    amitriptyline (ELAVIL) 25 MG tablet Take 1 tablet (25 mg total) by mouth nightly as needed for Insomnia.    aspirin (ECOTRIN) 81 MG EC tablet Take 81 mg by mouth once daily.    buPROPion (WELLBUTRIN XL) 150 MG TB24 tablet Take 1 tablet (150 mg total) by mouth once daily.    naltrexone (DEPADE) 50 mg tablet TAKE ONE TABLET BY MOUTH ONCE DAILY AND AS NEEDED FOR craving. maximum OF 2 TABLETS PER DAY.    nystatin (MYCOSTATIN) powder Apply topically 4 (four) times daily.    rosuvastatin (CRESTOR) 5 MG tablet Take 1 tablet (5 mg total) by mouth once daily.    triazolam (HALCION) 0.25 MG Tab Take 1 tablet (0.25 mg total) by mouth nightly as needed (insomnia). FOR INSOMNIA    triazolam (HALCION) 0.25 MG Tab TAKE ONE TABLET BY MOUTH NIGHTLY AS NEEDED FOR INSOMNIA       Review of patient's allergies indicates:  No Known Allergies    Past Medical History:   Diagnosis Date    Anxiety disorder, unspecified     Claustrophobia     Depression     Descending thoracic aortic dissection     High cholesterol     Insomnia     Mitral valve prolapse     Obesity, unspecified     Personal history of colonic polyps      Past Surgical History:   Procedure Laterality Date    CATARACT EXTRACTION       SECTION      HYSTERECTOMY      NECK SURGERY       Family History       Problem Relation (Age of Onset)    No Known Problems Mother, Father          Tobacco Use    Smoking status: Former     Current packs/day: 1.50     Average packs/day: 1.5 packs/day for 30.0 years (45.0 ttl pk-yrs)     Types: Cigarettes    Smokeless tobacco: Never    Tobacco comments:     Quit 20 years ago   Substance and Sexual Activity    Alcohol use: Not on file    Drug use: Never    Sexual activity: Never     Review of Systems    Constitutional:  Negative for chills and fever.   HENT:  Negative for ear pain and trouble swallowing.    Eyes:  Negative for pain and redness.   Respiratory:  Negative for cough and chest tightness.    Cardiovascular:  Negative for chest pain, palpitations and leg swelling.   Gastrointestinal:  Negative for abdominal distention, abdominal pain, nausea and vomiting.   Genitourinary:  Negative for difficulty urinating.   Musculoskeletal:  Negative for back pain and neck pain.        Right tib-fib fracture   Skin:  Negative for color change, pallor and wound.   Neurological:  Negative for dizziness, syncope, speech difficulty, weakness, light-headedness, numbness and headaches.   Psychiatric/Behavioral:  Negative for agitation and suicidal ideas.    All other systems reviewed and are negative.    Objective:     Vital Signs (Most Recent):  Temp: 97.9 °F (36.6 °C) (01/02/24 0747)  Pulse: (!) 58 (01/02/24 1033)  Resp: 18 (01/02/24 0930)  BP: (!) 144/75 (01/02/24 1033)  SpO2: 98 % (01/02/24 1033) Vital Signs (24h Range):  Temp:  [97.9 °F (36.6 °C)] 97.9 °F (36.6 °C)  Pulse:  [58-79] 58  Resp:  [18] 18  SpO2:  [98 %-99 %] 98 %  BP: (144-149)/(75-79) 144/75        Body mass index is 23.69 kg/m².     Physical Exam  Constitutional:       Appearance: Normal appearance.   HENT:      Head: Normocephalic and atraumatic.      Nose: Nose normal.   Eyes:      Pupils: Pupils are equal, round, and reactive to light.   Cardiovascular:      Rate and Rhythm: Normal rate.      Pulses: Normal pulses.      Comments: Normal peripheral pulses  Pulmonary:      Effort: Pulmonary effort is normal. No respiratory distress.   Chest:      Chest wall: No tenderness.   Abdominal:      General: Abdomen is flat. Bowel sounds are normal. There is no distension.      Palpations: Abdomen is soft.      Tenderness: There is no abdominal tenderness.   Musculoskeletal:         General: Swelling, tenderness and signs of injury present. No deformity.       Cervical back: Normal range of motion and neck supple. No tenderness.   Skin:     General: Skin is warm and dry.      Capillary Refill: Capillary refill takes less than 2 seconds.      Findings: No lesion.   Neurological:      General: No focal deficit present.      Mental Status: She is alert and oriented to person, place, and time. Mental status is at baseline.   Psychiatric:         Mood and Affect: Mood normal.         Behavior: Behavior normal.         Thought Content: Thought content normal.         Judgment: Judgment normal.            I have reviewed all pertinent lab results within the past 24 hours.    Significant Diagnostics:  I have reviewed all pertinent imaging results/findings within the past 24 hours.

## 2024-01-02 NOTE — HPI
68-year-old female by report tripped and fell and was found to have a tib-fib fracture.  Known to Dr. Sanchez from a previous trauma approximately 2 years ago where she suffered head bleed, cervical fractures, aortic dissection requiring intervention.  Lead tib-fib fractures of the right.  She was finishing her operative course with Orthopedics at this time and she has been evaluated by our team preoperatively.  She was GCS 15.  No other injuries were found.  No other history is known other than surgical history of hysterectomy in the past.  Hemodynamically stable.

## 2024-01-02 NOTE — CONSULTS
[facility]                       [address] [place] [id number]    PATIENT NAME:       BELEN NARVAEZ  YOB: 1955  CSN:                [id number]   MRN:                [id number]  ADMIT DATE:         01/02/2024 07:49:00  PHYSICIAN:          Zoran Sanchez MD                            CONSULTATION    DATE OF CONSULT:  01/02/2024 00:00:00    CONSULTATION DIAGNOSIS:  Right ankle pilon fracture.    CHIEF COMPLAINT:  Right ankle pain.    HISTORY OF PRESENT ILLNESS:  The patient is a 68-year-old female, who is known   to me due to prior trauma requiring operative intervention.  She has done well   and has been discharged from my clinic since the last evaluation.  States that   she has had no issues with her pelvis.  She returns to the emergency department   today after a trip and fall at home and states that she had a twisting injury to   her right ankle with pain and deformity and inability to bear weight.  She was   brought in by EMS, seen in the emergency department.  Trauma team was called and   Orthopedics was consultative for evaluation of her distal tibia fracture.  Upon   my evaluation, she is resting comfortably.  Her limb was elevated.  She has a   splint in place.  She reports no other injuries and no loss of consciousness and   did not strike her head.    REVIEW OF SYSTEMS:  All reported negative aside from HPI  Constitutional: Negative for chills and fever.   HENT: Negative for congestion and hearing loss.    Eyes: Negative for visual disturbance.   Cardiovascular: Negative for chest pain and syncope.   Respiratory: Negative for cough and shortness of breath.    Hematologic/Lymphatic: Does not bruise/bleed easily.   Skin: Negative for color change and rash.   Gastrointestinal: Negative for abdominal pain, nausea and vomiting.   Genitourinary: Negative for dysuria and hematuria.   Neurological:  Negative for numbness, sensory change and weakness.   Psychiatric/Behavioral: Negative for altered mental status.       PAST MEDICAL HISTORY:  Anxiety, depression, hypercholesterolemia, and mitral   valve prolapse.    PAST SURGICAL HISTORY:  , hysterectomy, cataract surgery, she has had   prior orthopedic procedure for her pelvic ring with left-sided   transsacral-transiliac screws.    SOCIAL HISTORY:  Denies alcohol, tobacco, or drug use.  She is a former smoker.    PHYSICAL EXAMINATION:  GENERAL:  She is in no apparent distress.  She is awake, alert, and oriented.   HEAD, EYES, EARS, NOSE, THROAT:  Her extraocular movements are intact.    Normocephalic, atraumatic.  PULMONARY:  She has unlabored respirations.  Symmetric chest rise.  CARDIOVASCULAR:  She has normal rate, regular rhythm.  She has normal peripheral   perfusion.  GI:  Her abdomen is soft, nontender, nondistended.  INTEGUMENTARY SYSTEM:  Skin is warm, dry, and intact.  MUSCULOSKELETAL:  Evaluation of her affected right lower extremity, she has a   posterior splint with stirrups in place.  Her lower limb compartments are soft   and compressible.  She has a palpable DP pulse and sensation to light touch is   intact.    IMAGING STUDIES:  X-ray and CT evaluations reveal a comminuted right   intra-articular fracture to the distal tibia and fibula.    PLAN:  An extensive discussion today with the patient regarding her plan of care   would include possible definitive internal fixation plate and screws today.  We   will have to evaluate her soft tissues.  If she has significant swelling, we   may need to a staged her procedures and perform fixation with an external   fixator today and she understands and agrees with all that we have discussed and   we will plan to take her to the operating room later this morning.  She is   currently n.p.o. and ready for surgery today.  She has had nothing to eat or   drink since last  night.        ______________________________  MD DOM Doran/EMILY  DD:  01/02/2024  Time:  11:52AM  DT:  01/02/2024  Time:  12:30PM  Job #:  [id number]      CONSULTATION

## 2024-01-02 NOTE — PROGRESS NOTES
Patient has a right distal tibia fracture with intra-articular extension.  Associated fibula fracture meeting surgical indications for stabilization.  Patient to be NPO.  We will plan for OR fixation today if OR time allows.    This note/OR report was created with the assistance of  voice recognition software or phone  dictation.  There may be transcription errors as a result of using this technology however minimal. Effort has been made to assure accuracy of transcription but any obvious errors or omissions should be clarified with the author of the document.       Esa Allen, DO  Orthopedic Trauma Surgery

## 2024-01-02 NOTE — ASSESSMENT & PLAN NOTE
Lovenox 40 b.i.d.   WA protocol   Regular diet postop   IV fluids can stop postop  Weightbearing per Orthopedics   PTOT  Likely discharge in the next 24 hours   May need home health or outpatient therapy   Multimodal pain control

## 2024-01-02 NOTE — ANESTHESIA PROCEDURE NOTES
Peripheral Block    Patient location during procedure: pre-op   Block not for primary anesthetic.  Reason for block: at surgeon's request and post-op pain management   Post-op Pain Location: Right ankle   Start time: 1/2/2024 11:27 AM  Timeout: 1/2/2024 11:27 AM   End time: 1/2/2024 11:28 AM    Staffing  Authorizing Provider: Mary Perry MD  Performing Provider: Mary Perry MD    Staffing  Performed by: Mary Perry MD  Authorized by: Mary Perry MD    Preanesthetic Checklist  Completed: patient identified, IV checked, site marked, risks and benefits discussed, surgical consent, monitors and equipment checked, pre-op evaluation and timeout performed  Peripheral Block  Patient position: supine  Prep: ChloraPrep  Patient monitoring: heart rate, cardiac monitor, continuous pulse ox, continuous capnometry and frequent blood pressure checks  Block type: popliteal  Laterality: right  Injection technique: single shot  Needle  Needle type: Stimuplex   Needle gauge: 21 G  Needle length: 4 in  Needle localization: anatomical landmarks and ultrasound guidance   -ultrasound image captured on disc.  Assessment  Injection assessment: negative aspiration, negative parasthesia and local visualized surrounding nerve  Paresthesia pain: none  Heart rate change: no  Slow fractionated injection: yes        Additional Notes  VSS.  [facility] RN monitoring vitals throughout procedure.  Patient tolerated procedure well.

## 2024-01-02 NOTE — BRIEF OP NOTE
[facility] - Periop Services  Brief Operative Note    SUMMARY     Surgery Date: 1/2/2024     Surgeon(s) and Role:     * Zoran Sanchez MD - Primary    Assisting Surgeon: None    Pre-op Diagnosis:  Closed fracture of right ankle, pilon    Post-op Diagnosis:  same    Procedure(s) (LRB):  ORIF, FRACTURE, PILON (Right) including fibula    Anesthesia: General    Implants:  Implant Name Type Inv. Item Serial No.  Lot No. LRB No. Used Action   4.0 mm Cannulated Compression Headless Screw (TAV)      Right 1 Implanted   SCREW BONE COMP [id number] - [id number]  SCREW BONE COMP [id number]  [employer]  Right 1 Implanted   SCREW CORTEX 3.5 X 24 - [id number]  SCREW CORTEX 3.5 X 24  [employer]  Right 2 Implanted   SCREW CORTEX 3.5 X 26 - [id number]  SCREW CORTEX 3.5 X 26  [employer]  Right 1 Implanted   SCREW CAN 4.8HIR72UZ. - [id number]  SCREW CAN 4.2WJJ96RU.  [employer]  Right 1 Implanted   2.7mm 3.5 mm Variable Angle LCP Medial Distal Tibia Plates      Right 1 Implanted   PLATE LCP DIST FIB VA 7H R 2.7 - [id number]  PLATE LCP DIST FIB VA 7H R 2.7  [employer]  Right 1 Implanted   SCREW MTPHSEAL ST T8 2.7X16MM - [id number]  SCREW MTPHSEAL ST T8 2.7X16MM  [employer]  Right 1 Implanted   SCREW 2.7MM X 16 LOCKING ANGLE - [id number]  SCREW 2.7MM X 16 LOCKING ANGLE  [employer]  Right 2 Implanted   SCREW 2.7MM X 18 LOCKING ANGLE - [id number]  SCREW 2.7MM X 18 LOCKING ANGLE  [employer]  Right 1 Explanted   SCREW BONE LOCK VA 2.7X30MM - [id number]  SCREW BONE LOCK VA 2.7X30MM  [employer]  Right 1 Implanted   SCREW STRDRV VA HOOD 2.7X38 - [id number]  SCREW STRDRV VA HOOD 2.7X38  [employer]  Right 1 Implanted   SCREW BONE LOCK VA 2.7X40MM - [id number]  SCREW BONE LOCK VA 2.7X40MM  [employer]  Right 2 Implanted   SCREW VA HOOD ST T8 2.7X46MM - [id number]  SCREW VA HOOD ST T8 2.7X46MM  [employer]  Right 2 Implanted   SCREW BONE LOCK VA 2.7X50MM - [id number]  SCREW BONE LOCK VA 2.7X50MM  [employer]  Right 1 Implanted   SCREW  2.7MM X 14 LOCKING ANGLE - [id number]  SCREW 2.7MM X 14 LOCKING ANGLE  [employer]  Right 1 Implanted   SCREW 2.7MM X 16 LOCKING ANGLE - [id number]  SCREW 2.7MM X 16 LOCKING ANGLE  [employer]  Right 2 Implanted   SCREW LOCKING BONE 2.7X20MM - [id number]  SCREW LOCKING BONE 2.7X20MM  [employer]  Right 1 Implanted   SCREW CORTEX 2.7 X 14. - [id number]  SCREW CORTEX 2.7 X 14.  [employer]  Right 1 Implanted   SCREW CORTEX 2.7 X 16. - [id number]  SCREW CORTEX 2.7 X 16.  [employer]  Right 2 Implanted       Operative Findings: see op report    Estimated Blood Loss: 75 mL    Estimated Blood Loss has been documented.         Specimens:   Specimen (24h ago, onward)      None            [id number]  A/P: Tolerated procedure well. Admit to floor. NWB RLE. Keep limb elevated. Ancef for 24hrs. Lovenox while in house. Ok for d/c home tomorrow.     This note/OR report was created with the assistance of  voice recognition software or phone  dictation.  There may be transcription errors as a result of using this technology however minimal. Effort has been made to assure accuracy of transcription but any obvious errors or omissions should be clarified with the author of the document.         Zoran Sanchez MD  Orthopedic Trauma  [facility]

## 2024-01-02 NOTE — ANESTHESIA POSTPROCEDURE EVALUATION
Anesthesia Post Evaluation    Patient: Nazia HUTCHINSON [place]    Procedure(s) Performed: Procedure(s) (LRB):  ORIF, FRACTURE, PILON (Right)    Final Anesthesia Type: general      Patient location during evaluation: PACU  Patient participation: Yes- Able to Participate  Level of consciousness: awake and alert  Post-procedure vital signs: reviewed and stable  Pain management: adequate  Airway patency: patent      Anesthetic complications: no      Cardiovascular status: hemodynamically stable  Respiratory status: unassisted  Hydration status: euvolemic  Follow-up not needed.              Vitals Value Taken Time   /68 01/02/24 1503   Temp 36.8 °C (98.2 °F) 01/02/24 1436   Pulse 68 01/02/24 1503   Resp 14 01/02/24 1503   SpO2 95 % 01/02/24 1503   Vitals shown include unvalidated device data.      No case tracking events are documented in the log.      Pain/Nancy Score: Pain Rating Prior to Med Admin: 9 (1/2/2024  2:40 PM)  Nancy Score: 10 (1/2/2024  3:00 PM)

## 2024-01-03 LAB
ALBUMIN SERPL-MCNC: 3.4 G/DL (ref 3.4–4.8)
ALBUMIN/GLOB SERPL: 1.7 RATIO (ref 1.1–2)
ALP SERPL-CCNC: 71 UNIT/L (ref 40–150)
ALT SERPL-CCNC: 16 UNIT/L (ref 0–55)
AST SERPL-CCNC: 21 UNIT/L (ref 5–34)
BASOPHILS # BLD AUTO: 0.02 X10(3)/MCL
BASOPHILS NFR BLD AUTO: 0.3 %
BILIRUB SERPL-MCNC: 0.5 MG/DL
BUN SERPL-MCNC: 9.9 MG/DL (ref 9.8–20.1)
CALCIUM SERPL-MCNC: 8 MG/DL (ref 8.4–10.2)
CHLORIDE SERPL-SCNC: 108 MMOL/L (ref 98–107)
CO2 SERPL-SCNC: 23 MMOL/L (ref 23–31)
CREAT SERPL-MCNC: 0.63 MG/DL (ref 0.55–1.02)
EOSINOPHIL # BLD AUTO: 0.02 X10(3)/MCL (ref 0–0.9)
EOSINOPHIL NFR BLD AUTO: 0.3 %
ERYTHROCYTE [DISTWIDTH] IN BLOOD BY AUTOMATED COUNT: 14.1 % (ref 11.5–17)
GFR SERPLBLD CREATININE-BSD FMLA CKD-EPI: >60 MLS/MIN/1.73/M2
GLOBULIN SER-MCNC: 2 GM/DL (ref 2.4–3.5)
GLUCOSE SERPL-MCNC: 116 MG/DL (ref 82–115)
HCT VFR BLD AUTO: 32.1 % (ref 37–47)
HGB BLD-MCNC: 10.8 G/DL (ref 12–16)
IMM GRANULOCYTES # BLD AUTO: 0.02 X10(3)/MCL (ref 0–0.04)
IMM GRANULOCYTES NFR BLD AUTO: 0.3 %
LACTATE SERPL-SCNC: 0.9 MMOL/L (ref 0.5–2.2)
LYMPHOCYTES # BLD AUTO: 1 X10(3)/MCL (ref 0.6–4.6)
LYMPHOCYTES NFR BLD AUTO: 14.6 %
MAGNESIUM SERPL-MCNC: 2.2 MG/DL (ref 1.6–2.6)
MCH RBC QN AUTO: 31.3 PG (ref 27–31)
MCHC RBC AUTO-ENTMCNC: 33.6 G/DL (ref 33–36)
MCV RBC AUTO: 93 FL (ref 80–94)
MONOCYTES # BLD AUTO: 1.05 X10(3)/MCL (ref 0.1–1.3)
MONOCYTES NFR BLD AUTO: 15.4 %
NEUTROPHILS # BLD AUTO: 4.73 X10(3)/MCL (ref 2.1–9.2)
NEUTROPHILS NFR BLD AUTO: 69.1 %
NRBC BLD AUTO-RTO: 0 %
PHOSPHATE SERPL-MCNC: 3.4 MG/DL (ref 2.3–4.7)
PLATELET # BLD AUTO: 252 X10(3)/MCL (ref 130–400)
PMV BLD AUTO: 9.4 FL (ref 7.4–10.4)
POTASSIUM SERPL-SCNC: 3.7 MMOL/L (ref 3.5–5.1)
PROT SERPL-MCNC: 5.4 GM/DL (ref 5.8–7.6)
RBC # BLD AUTO: 3.45 X10(6)/MCL (ref 4.2–5.4)
SODIUM SERPL-SCNC: 138 MMOL/L (ref 136–145)
WBC # SPEC AUTO: 6.84 X10(3)/MCL (ref 4.5–11.5)

## 2024-01-03 PROCEDURE — 25000003 PHARM REV CODE 250: Performed by: NURSE PRACTITIONER

## 2024-01-03 PROCEDURE — 99223 1ST HOSP IP/OBS HIGH 75: CPT | Mod: ,,, | Performed by: ORTHOPAEDIC SURGERY

## 2024-01-03 PROCEDURE — 84100 ASSAY OF PHOSPHORUS: CPT | Performed by: NURSE PRACTITIONER

## 2024-01-03 PROCEDURE — 63600175 PHARM REV CODE 636 W HCPCS: Performed by: ORTHOPAEDIC SURGERY

## 2024-01-03 PROCEDURE — 63600175 PHARM REV CODE 636 W HCPCS: Performed by: NURSE PRACTITIONER

## 2024-01-03 PROCEDURE — 97162 PT EVAL MOD COMPLEX 30 MIN: CPT

## 2024-01-03 PROCEDURE — 80053 COMPREHEN METABOLIC PANEL: CPT | Performed by: NURSE PRACTITIONER

## 2024-01-03 PROCEDURE — 85025 COMPLETE CBC W/AUTO DIFF WBC: CPT | Performed by: NURSE PRACTITIONER

## 2024-01-03 PROCEDURE — 11000001 HC ACUTE MED/SURG PRIVATE ROOM

## 2024-01-03 PROCEDURE — 83605 ASSAY OF LACTIC ACID: CPT | Performed by: NURSE PRACTITIONER

## 2024-01-03 PROCEDURE — 83735 ASSAY OF MAGNESIUM: CPT | Performed by: NURSE PRACTITIONER

## 2024-01-03 RX ORDER — METHOCARBAMOL 500 MG/1
1000 TABLET, FILM COATED ORAL EVERY 8 HOURS
Status: DISCONTINUED | OUTPATIENT
Start: 2024-01-03 | End: 2024-01-04 | Stop reason: HOSPADM

## 2024-01-03 RX ORDER — ZOLPIDEM TARTRATE 5 MG/1
5 TABLET ORAL NIGHTLY PRN
Status: DISCONTINUED | OUTPATIENT
Start: 2024-01-03 | End: 2024-01-04 | Stop reason: HOSPADM

## 2024-01-03 RX ORDER — ALPRAZOLAM 0.25 MG/1
0.25 TABLET ORAL 3 TIMES DAILY PRN
Status: DISCONTINUED | OUTPATIENT
Start: 2024-01-03 | End: 2024-01-04 | Stop reason: HOSPADM

## 2024-01-03 RX ORDER — DULOXETIN HYDROCHLORIDE 20 MG/1
20 CAPSULE, DELAYED RELEASE ORAL 2 TIMES DAILY
Status: DISCONTINUED | OUTPATIENT
Start: 2024-01-03 | End: 2024-01-04 | Stop reason: HOSPADM

## 2024-01-03 RX ORDER — ATORVASTATIN CALCIUM 10 MG/1
20 TABLET, FILM COATED ORAL DAILY
Status: DISCONTINUED | OUTPATIENT
Start: 2024-01-03 | End: 2024-01-04 | Stop reason: HOSPADM

## 2024-01-03 RX ORDER — AMITRIPTYLINE HYDROCHLORIDE 25 MG/1
25 TABLET, FILM COATED ORAL NIGHTLY PRN
Status: DISCONTINUED | OUTPATIENT
Start: 2024-01-03 | End: 2024-01-04 | Stop reason: HOSPADM

## 2024-01-03 RX ORDER — BUPROPION HYDROCHLORIDE 150 MG/1
150 TABLET ORAL DAILY
Status: DISCONTINUED | OUTPATIENT
Start: 2024-01-03 | End: 2024-01-04 | Stop reason: HOSPADM

## 2024-01-03 RX ORDER — ALPRAZOLAM 0.25 MG/1
0.25 TABLET ORAL DAILY PRN
Status: DISCONTINUED | OUTPATIENT
Start: 2024-01-03 | End: 2024-01-03

## 2024-01-03 RX ADMIN — DOCUSATE SODIUM 100 MG: 100 CAPSULE, LIQUID FILLED ORAL at 08:01

## 2024-01-03 RX ADMIN — POLYETHYLENE GLYCOL 3350 17 G: 17 POWDER, FOR SOLUTION ORAL at 08:01

## 2024-01-03 RX ADMIN — ACETAMINOPHEN 650 MG: 325 TABLET, FILM COATED ORAL at 05:01

## 2024-01-03 RX ADMIN — THERA TABS 1 TABLET: TAB at 08:01

## 2024-01-03 RX ADMIN — METHOCARBAMOL 500 MG: 500 TABLET ORAL at 05:01

## 2024-01-03 RX ADMIN — OXYCODONE HYDROCHLORIDE 10 MG: 5 TABLET ORAL at 02:01

## 2024-01-03 RX ADMIN — OXYCODONE HYDROCHLORIDE 10 MG: 5 TABLET ORAL at 05:01

## 2024-01-03 RX ADMIN — KETOROLAC TROMETHAMINE 15 MG: 30 INJECTION, SOLUTION INTRAMUSCULAR; INTRAVENOUS at 12:01

## 2024-01-03 RX ADMIN — OXYCODONE HYDROCHLORIDE 10 MG: 5 TABLET ORAL at 01:01

## 2024-01-03 RX ADMIN — METHOCARBAMOL 1000 MG: 500 TABLET ORAL at 09:01

## 2024-01-03 RX ADMIN — ACETAMINOPHEN 650 MG: 325 TABLET, FILM COATED ORAL at 09:01

## 2024-01-03 RX ADMIN — THIAMINE HCL TAB 100 MG 100 MG: 100 TAB at 08:01

## 2024-01-03 RX ADMIN — FOLIC ACID 1 MG: 1 TABLET ORAL at 08:01

## 2024-01-03 RX ADMIN — ENOXAPARIN SODIUM 40 MG: 100 INJECTION SUBCUTANEOUS at 08:01

## 2024-01-03 RX ADMIN — GABAPENTIN 300 MG: 300 CAPSULE ORAL at 08:01

## 2024-01-03 RX ADMIN — AMITRIPTYLINE HYDROCHLORIDE 25 MG: 25 TABLET, FILM COATED ORAL at 08:01

## 2024-01-03 RX ADMIN — BUPROPION HYDROCHLORIDE 150 MG: 150 TABLET, EXTENDED RELEASE ORAL at 08:01

## 2024-01-03 RX ADMIN — KETOROLAC TROMETHAMINE 15 MG: 30 INJECTION, SOLUTION INTRAMUSCULAR; INTRAVENOUS at 06:01

## 2024-01-03 RX ADMIN — KETOROLAC TROMETHAMINE 15 MG: 30 INJECTION, SOLUTION INTRAMUSCULAR; INTRAVENOUS at 05:01

## 2024-01-03 RX ADMIN — DULOXETINE HYDROCHLORIDE 20 MG: 20 CAPSULE, DELAYED RELEASE ORAL at 08:01

## 2024-01-03 RX ADMIN — ACETAMINOPHEN 650 MG: 325 TABLET, FILM COATED ORAL at 01:01

## 2024-01-03 RX ADMIN — GABAPENTIN 300 MG: 300 CAPSULE ORAL at 02:01

## 2024-01-03 RX ADMIN — ALPRAZOLAM 0.25 MG: 0.25 TABLET ORAL at 04:01

## 2024-01-03 RX ADMIN — KETOROLAC TROMETHAMINE 15 MG: 30 INJECTION, SOLUTION INTRAMUSCULAR; INTRAVENOUS at 01:01

## 2024-01-03 RX ADMIN — METHOCARBAMOL 1000 MG: 500 TABLET ORAL at 02:01

## 2024-01-03 RX ADMIN — CEFAZOLIN SODIUM 2 G: 2 SOLUTION INTRAVENOUS at 09:01

## 2024-01-03 RX ADMIN — CEFAZOLIN SODIUM 2 G: 2 SOLUTION INTRAVENOUS at 01:01

## 2024-01-03 RX ADMIN — OXYCODONE HYDROCHLORIDE 10 MG: 5 TABLET ORAL at 09:01

## 2024-01-03 RX ADMIN — OXYCODONE HYDROCHLORIDE 10 MG: 5 TABLET ORAL at 08:01

## 2024-01-03 NOTE — PLAN OF CARE
01/03/24 0944   Discharge Assessment   Assessment Type Discharge Planning Assessment   Confirmed/corrected address, phone number and insurance Yes   Confirmed Demographics Correct on Facesheet   Source of Information patient   When was your last doctors appointment?   (Pt states last visit with Dr Kat II was in April)   Reason For Admission closed right pilon fracture   People in Home alone   Do you expect to return to your current living situation? Yes   Do you have help at home or someone to help you manage your care at home? Yes   Who are your caregiver(s) and their phone number(s)? Pt states she has several family and friends that are available to help when she is discharged- Jeanie Burden is listed as emergency contact [phone], she is  a friend   Current cognitive status: Alert/Oriented;No Deficits   Walking or Climbing Stairs Difficulty no   Dressing/Bathing Difficulty no   Home Layout Able to live on 1st floor   Equipment Currently Used at Home none   Do you currently have service(s) that help you manage your care at home? No   Do you take prescription medications? Yes   Do you have prescription coverage? Yes   Coverage Medicare A&B   Do you have any problems affording any of your prescribed medications? No   Is the patient taking medications as prescribed? yes   Who is going to help you get home at discharge? Pt states she will have friend or family member drive her home   How do you get to doctors appointments? car, drives self   Are you on dialysis? No   Do you take coumadin? No   Discharge Plan A Home with family   Discharge Plan B Home with family   DME Needed Upon Discharge    (TBD)   Discharge Plan discussed with: Patient   Transition of Care Barriers None     Pt states she live alone in a single level home with no steps to enter the home. She states she is independent with ADL's and was able to drive prior to admission. She does not have DME and is not active with a home health agency.  She states she will have plenty of help from family and friends when she is discharged. Will follow for discharge needs.

## 2024-01-03 NOTE — PROGRESS NOTES
"Patient Name: Nazia Rodríguez  MRN: [id number]  Admission Date: 1/2/2024  Hospital Length of Stay: 1 days  Attending Provider: Royal Garrett MD  Primary Care Provider: Casimiro Kat II, MD  Follow-up For: Procedure(s) (LRB):  ORIF, FRACTURE, PILON (Right)    Post-Operative Day: 1 Day Post-Op  Subjective:       Principal Orthopedic Problem: 1 Day Post-Op       Interval History:  Status post open reduction internal fixation of right ankle pilon fracture.  Resting comfortably this morning.  No acute issues since surgery.  She reports some burning along the medial aspect of her ankle over her incision.  Ankle is elevated and iced currently.    Review of patient's allergies indicates:  No Known Allergies    Current Facility-Administered Medications   Medication    acetaminophen tablet 650 mg    ALPRAZolam tablet 0.25 mg    amitriptyline tablet 25 mg    atorvastatin tablet 20 mg    buPROPion TB24 tablet 150 mg    cefazolin (ANCEF) 2 gram in dextrose 5% 50 mL IVPB (premix)    docusate sodium capsule 100 mg    DULoxetine DR capsule 20 mg    enoxaparin injection 40 mg    folic acid tablet 1 mg    gabapentin capsule 300 mg    ketorolac injection 15 mg    LORazepam tablet 1 mg    magnesium hydroxide 400 mg/5 ml suspension 2,400 mg    melatonin tablet 6 mg    methocarbamoL tablet 1,000 mg    multivitamin tablet    oxyCODONE immediate release tablet 5 mg    oxyCODONE immediate release tablet Tab 10 mg    polyethylene glycol packet 17 g    thiamine tablet 100 mg    zolpidem tablet 5 mg     Objective:     Vital Signs (Most Recent):  Temp: 98.5 °F (36.9 °C) (01/03/24 0711)  Pulse: (!) 54 (01/03/24 0711)  Resp: 19 (01/03/24 0711)  BP: (!) 102/54 (01/03/24 0711)  SpO2: 97 % (01/03/24 0711) Vital Signs (24h Range):  Temp:  [97.9 °F (36.6 °C)-98.6 °F (37 °C)] 98.5 °F (36.9 °C)  Pulse:  [54-76] 54  Resp:  [10-33] 19  SpO2:  [93 %-100 %] 97 %  BP: (102-158)/(54-92) 102/54     Weight: 62.6 kg (138 lb 0.1 oz)  Height: 5' 4" " (162.6 cm)  Body mass index is 23.69 kg/m².      Intake/Output Summary (Last 24 hours) at 1/3/2024 0803  Last data filed at 1/2/2024 1426  Gross per 24 hour   Intake 1700 ml   Output 75 ml   Net 1625 ml       Physical Exam:   Ortho/SPM Exam    General the patient is alert and oriented x3 no acute distress nontoxic-appearing appropriate affect.    Constitutional: Vital signs are examined and stable.  Resp: No signs of labored breathing    Musculoskeletal Exam:  Right lower extremity:  Dressings and splint intact, clean and dry.  Lower limb compartments are soft and compressible.  Brisk capillary refill to all digits.  Palpable DP pulse.  Sensation light touch intact distally.    Diagnostic Findings:   Significant Labs: BMP:   Recent Labs   Lab 01/03/24  0443      K 3.7   CO2 23   BUN 9.9   CREATININE 0.63   CALCIUM 8.0*   MG 2.20     CBC:   Recent Labs   Lab 01/02/24  0809 01/03/24  0443   WBC 4.39* 6.84   HGB 13.8 10.8*   HCT 41.5 32.1*    252     CMP:   Recent Labs   Lab 01/02/24  1006 01/03/24  0443    138   K 4.6 3.7   CO2 21* 23   BUN 18.1 9.9   CREATININE 0.63 0.63   CALCIUM 9.1 8.0*   ALBUMIN 4.0 3.4   BILITOT 0.4 0.5   ALKPHOS 94 71   AST 17 21   ALT 20 16     All pertinent labs within the past 24 hours have been reviewed.        Significant Imaging: I have reviewed and interpreted all pertinent imaging results/findings.     Assessment/Plan:     Active Diagnoses:    Diagnosis Date Noted POA    Closed right pilon fracture, initial encounter [S82.871A] 01/02/2024 Yes    Closed fracture of right tibia and fibula [S82.201A, S82.401A] 01/02/2024 Yes      Problems Resolved During this Admission:   H/H down as expected postop due to acute blood loss anemia.  Dressings are clean and dry, we will remain intact for 2 weeks.  She will need to remain nonweightbearing to the right lower extremity.  Keep limb elevated at rest.  Okay for discharge from an orthopedic standpoint once her home situation is  settled she has assistance in his able to mobilize.  She will work PT today.  Continue Ancef today, Lovenox for DVT prophylaxis while in-house and then switch to enteric-coated aspirin 81 mg p.o. b.i.d. upon discharge.  She will follow up with me in 2 weeks for removal of her sutures and transition into a boot.  Discussed the plan of care with the patient she understands and agrees with all that we have discussed today and all questions and concerns were addressed.      This note/OR report was created with the assistance of  voice recognition software or phone  dictation.  There may be transcription errors as a result of using this technology however minimal. Effort has been made to assure accuracy of transcription but any obvious errors or omissions should be clarified with the author of the document.           Zoran Sanchez MD  Orthopedic Trauma Surgery  [facility] - 8th Floor Med Surg

## 2024-01-03 NOTE — NURSING
Nurses Note -- 4 Eyes      1/3/2024   7:37 AM      Skin assessed during: Admit      [x] No Altered Skin Integrity Present    []Prevention Measures Documented      [] Yes- Altered Skin Integrity Present or Discovered   [] LDA Added if Not in Epic (Describe Wound)   [] New Altered Skin Integrity was Present on Admit and Documented in LDA   [] Wound Image Taken    Wound Care Consulted? No    Attending Nurse:  lisa Bustamante RN/Staff Member:   Leni

## 2024-01-03 NOTE — TERTIARY TRAUMA SURVEY NOTE
TERTIARY TRAUMA SURVEY (TTS)    List Injuries Identified to Date:   1. Closed right tib-fib fracture    List Operations and Procedures:   1. Open reduction internal fixation right tib-fib    Past Surgical History:   Procedure Laterality Date    CATARACT EXTRACTION       SECTION      HYSTERECTOMY      NECK SURGERY         Incidental findings:   1. None    Past Medical History:   1. As below and insomnia, pelvic fractures, head bleed    Active Ambulatory Problems     Diagnosis Date Noted    Descending thoracic aortic dissection     Nodule of right lung     Atherosclerosis of native coronary artery of native heart without angina pectoris 2022    Other hyperlipidemia 2022    PVD (peripheral vascular disease) 2022    Localized swelling of abdominal wall 2022    Lipoma of right lower extremity 2022    Dissection of aorta 2023    Pulmonary emphysema, unspecified emphysema type 2023    Depression 2023     Resolved Ambulatory Problems     Diagnosis Date Noted    Primary hypertension 2022     Past Medical History:   Diagnosis Date    Anxiety disorder, unspecified     Claustrophobia     High cholesterol     Insomnia     Mitral valve prolapse     Obesity, unspecified     Personal history of colonic polyps      Past Medical History:   Diagnosis Date    Anxiety disorder, unspecified     Claustrophobia     Depression     Descending thoracic aortic dissection     High cholesterol     Insomnia     Mitral valve prolapse     Obesity, unspecified     Personal history of colonic polyps        Tertiary Physical Exam:     Physical Exam  Constitutional:       Appearance: Normal appearance.   HENT:      Head: Normocephalic and atraumatic.      Nose: Nose normal.   Eyes:      Pupils: Pupils are equal, round, and reactive to light.   Cardiovascular:      Rate and Rhythm: Normal rate.      Pulses: Normal pulses.      Comments: Normal peripheral pulses  Pulmonary:      Effort:  Pulmonary effort is normal. No respiratory distress.   Chest:      Chest wall: No tenderness.   Abdominal:      General: Abdomen is flat. Bowel sounds are normal. There is no distension.      Palpations: Abdomen is soft.      Tenderness: There is no abdominal tenderness.   Musculoskeletal:         General: Tenderness and signs of injury present. No swelling or deformity.      Cervical back: Normal range of motion and neck supple. No tenderness.   Skin:     General: Skin is warm and dry.      Capillary Refill: Capillary refill takes less than 2 seconds.      Findings: No lesion.   Neurological:      General: No focal deficit present.      Mental Status: She is alert and oriented to person, place, and time. Mental status is at baseline.   Psychiatric:         Mood and Affect: Mood normal.         Behavior: Behavior normal.         Thought Content: Thought content normal.         Judgment: Judgment normal.         Imaging Review:     Imaging Results              CT 3D RECON WITHOUT INDEPENDENT WS (Final result)  Result time 01/02/24 10:10:46      Final result by Stuart Reyes MD (01/02/24 10:10:46)                   Impression:    FINDINGS/  3D reconstructions of the right ankle were created based on source data from accession number [id number].  Please see that report for details.      Electronically signed by: Stuart Reyes  Date:    01/02/2024  Time:    10:10               Narrative:    EXAMINATION:  CT 3D WITHOUT INDEPENDENT WS    CLINICAL HISTORY:  right ankle surgical planning;                                       CT Ankle (Including Hindfoot) Without Contrast Right (Final result)  Result time 01/02/24 09:53:44      Final result by Stuart Reyes MD (01/02/24 09:53:44)                   Impression:      Fractures of the distal tibia and fibula as discussed.      Electronically signed by: Stuart Reyes  Date:    01/02/2024  Time:    09:53               Narrative:    EXAMINATION:  CT ANKLE (INCLUDING HINDFOOT)  WITHOUT CONTRAST RIGHT    CLINICAL HISTORY:  Fracture, ankle;    TECHNIQUE:  Helical acquisition through the right ankle without IV contrast.  Three plane reconstructions were provided for review. DLP 41 mGycm. Automatic exposure control, adjustment of mA/kV or iterative reconstruction technique was used to reduce radiation.    COMPARISON:  Radiographs earlier today    FINDINGS:  There is comminuted intra-articular fracture of the distal tibia extending through the posterior malleolus.  Ossification off the tip of the medial malleolus is likely a chronic finding.  There is also comminuted fracture of the distal fibular shaft.  The ankle mortises are not significantly widened.  Talus and calcaneus are intact.                                       X-Ray Tibia Fibula 2 View Right (Final result)  Result time 01/02/24 08:41:51      Final result by Richie Sarah MD (01/02/24 08:41:51)                   Impression:      As above.      Electronically signed by: Richie Sarah  Date:    01/02/2024  Time:    08:41               Narrative:    EXAMINATION:  XR TIBIA FIBULA 2 VIEW RIGHT    CLINICAL HISTORY:  Unspecified fall, initial encounter    TECHNIQUE:  Two views of the right tibia and fibula.    COMPARISON:  No prior imaging available for comparison    FINDINGS:  Highly comminuted fracture of the distal tibia and fibula with brace material in place.  Proximal fibula appears intact.                                       X-Ray Ankle Complete Right (Final result)  Result time 01/02/24 08:27:44      Final result by Orlando Guerrero MD (01/02/24 08:27:44)                   Impression:      Fractures as above.      Electronically signed by: Orlando Guerrero  Date:    01/02/2024  Time:    08:27               Narrative:    EXAMINATION:  XR ANKLE COMPLETE 3 VIEW RIGHT    CLINICAL HISTORY:  Injury, unspecified, initial encounter    COMPARISON:  None.    FINDINGS:  Comminuted displaced fracture involving the distal tibia and  "distal fibula with the small avulsion of the tip of the medial malleolus no other definite fractures or dislocations identified    Joint spaces preserved.    No blastic or lytic lesions.    Soft tissues within normal limits.                                       Lab Review:   CBC:  Recent Labs   Lab Result Units 01/02/24  0809 01/03/24  0443   WBC x10(3)/mcL 4.39* 6.84   RBC x10(6)/mcL 4.39 3.45*   Hgb g/dL 13.8 10.8*   Hct % 41.5 32.1*   Platelet x10(3)/mcL 328 252   MCV fL 94.5* 93.0   MCH pg 31.4* 31.3*   MCHC g/dL 33.3 33.6       CMP:  Recent Labs   Lab Result Units 01/02/24  1006 01/03/24  0443   Calcium Level Total mg/dL 9.1 8.0*   Albumin Level g/dL 4.0 3.4   Sodium Level mmol/L 140 138   Potassium Level mmol/L 4.6 3.7   Carbon Dioxide mmol/L 21* 23   Blood Urea Nitrogen mg/dL 18.1 9.9   Creatinine mg/dL 0.63 0.63   Alkaline Phosphatase unit/L 94 71   Alanine Aminotransferase unit/L 20 16   Aspartate Aminotransferase unit/L 17 21   Bilirubin Total mg/dL 0.4 0.5       Troponin:  No results for input(s): "TROPONINI" in the last 2160 hours.    ETOH:  No results for input(s): "ETHANOL" in the last 72 hours.     Urine Drug Screen:  No results for input(s): "COCAINE", "OPIATE", "BARBITURATE", "AMPHETAMINE", "FENTANYL", "CANNABINOIDS", "MDMA" in the last 72 hours.    Invalid input(s): "BENZODIAZEPINE", "PHENCYCLIDINE"   Plan:   Nonweightbearing right lower extremity   Regular diet   Stop IV fluids   Multimodal pain control adjusted to increased duration of Toradol, added Cymbalta which has helped during a previous trauma, restart him more home medications   Incentive spirometry   Lovenox b.i.d.  Disposition and next 24 hours pending progress with therapy and pain control  Ernesto Cesar NP  c - [phone]    "

## 2024-01-03 NOTE — PT/OT/SLP EVAL
Physical Therapy Evaluation    Patient Name:  Nazia Rodríguez   MRN:  [id number]    Recommendations:     Discharge therapy intensity: Low Intensity Therapy   Discharge Equipment Recommendations: walker, rolling   Barriers to discharge: None    Assessment:     Nazia Rodríguez is a 68 y.o. female admitted with a closed right pilon fracture. S/p ORIF 1/2. She is to be [facility]. Prior to admit, patient was completely independent.  She presents with the following impairments/functional limitations: weakness, impaired self care skills, decreased lower extremity function, decreased safety awareness, pain. She was SBA for bed mobility. Ambulated 55 ft with RW & SBA. No LOB noted. Patient to benefit from skilled PT to address deficits, improve functional mobility, and progress towards functional independence. Recommending low intensity therapy and a rolling walker at discharge. Progress as tolerated.    Rehab Prognosis: Good; patient would benefit from acute skilled PT services to address these deficits and reach maximum level of function.    Recent Surgery: Procedure(s) (LRB):  ORIF, FRACTURE, PILON (Right) 1 Day Post-Op    Plan:     During this hospitalization, patient to be seen 6 x/week to address the identified rehab impairments via gait training, therapeutic activities, therapeutic exercises, neuromuscular re-education and progress toward the following goals:    Plan of Care Expires:  02/03/24    Subjective     Chief Complaint: none  Patient/Family Comments/goals: none  Pain/Comfort:  Pain Rating 1: 5/10  Location - Side 1: Right  Location 1: ankle  Pain Addressed 1: Reposition, Distraction  Pain Rating Post-Intervention 1: 5/10    Patients cultural, spiritual, Judaism conflicts given the current situation: no    Living Environment:  Lives alone in [facility].  Prior to admission, patients level of function was independent.  Equipment used at home: none.  DME owned (not currently used): none.  Upon discharge, patient  will have assistance from TBD.    Objective:     Communicated with nurse prior to session.  Patient found supine with telemetry  upon PT entry to room.    General Precautions: Standard, fall  Orthopedic Precautions:RLE non weight bearing   Braces: N/A  Respiratory Status: Room air      Exams:  Cognitive Exam:  Patient is oriented to Person, Place, Time, and Situation  Sensation: -       Intact  RLE ROM: WFL at hip and knee  RLE Strength: WFL except at ankle.   LLE ROM: WFL  LLE Strength: WFL  Skin integrity: Visible skin intact      Functional Mobility:  Bed Mobility:  Supine to Sit: stand by assistance  Transfers:  Sit to Stand:  stand by assistance with rolling walker  Gait: 55 ft with RW & SBA. No LOB noted  Balance: fair      AM-PAC 6 CLICK MOBILITY  Total Score:23     Education Provided:  Role and goals of PT, transfer training, bed mobility, gait training, balance training, safety awareness, assistive device, strengthening exercises, and importance of participating in PT to return to PLOF.    Patient left up in chair with all lines intact, call button in reach, and educated on calling for assistance when ready to return to bed .    GOALS:   Multidisciplinary Problems       Physical Therapy Goals          Problem: Physical Therapy    Goal Priority Disciplines Outcome Goal Variances Interventions   Physical Therapy Goal     PT, PT/OT Ongoing, Progressing     Description: Goals to be met by: 2/3/24     Patient will increase functional independence with mobility by performin. Supine to sit with [place]  2. Sit to supine with [place]  3. Sit to stand transfer with [place]  4. Gait  x 200 feet with Modified [place] using Rolling Walker.                          History:     Past Medical History:   Diagnosis Date    Anxiety disorder, unspecified     Claustrophobia     Depression     Descending thoracic aortic dissection     High cholesterol     Insomnia     Mitral valve prolapse      Obesity, unspecified     Personal history of colonic polyps        Past Surgical History:   Procedure Laterality Date    CATARACT EXTRACTION       SECTION      HYSTERECTOMY      NECK SURGERY      OPEN REDUCTION AND INTERNAL FIXATION (ORIF) OF PILON FRACTURE Right 2024    Procedure: ORIF, FRACTURE, PILON;  Surgeon: Zoran Sanchez MD;  Location: [facility];  Service: Orthopedics;  Laterality: Right;  EX-FIX -VS- ORIF RIGHT ANKLE //  SPINE // VASC // BONE FOAM // SYNTHES       Time Tracking:     PT Received On: 24  PT Start Time: 1227     PT Stop Time: 1245  PT Total Time (min): 18 min     Billable Minutes: Evaluation 18 minutes      2024

## 2024-01-03 NOTE — PLAN OF CARE
Problem: Physical Therapy  Goal: Physical Therapy Goal  Description: Goals to be met by: 2/3/24     Patient will increase functional independence with mobility by performin. Supine to sit with [place]  2. Sit to supine with [place]  3. Sit to stand transfer with [place]  4. Gait  x 200 feet with Modified [place] using Rolling Walker.     Outcome: Ongoing, Progressing

## 2024-01-04 VITALS
RESPIRATION RATE: 20 BRPM | TEMPERATURE: 98 F | DIASTOLIC BLOOD PRESSURE: 67 MMHG | HEIGHT: 64 IN | WEIGHT: 138 LBS | HEART RATE: 59 BPM | OXYGEN SATURATION: 95 % | BODY MASS INDEX: 23.56 KG/M2 | SYSTOLIC BLOOD PRESSURE: 133 MMHG

## 2024-01-04 LAB
ALBUMIN SERPL-MCNC: 3.2 G/DL (ref 3.4–4.8)
ALBUMIN/GLOB SERPL: 1.3 RATIO (ref 1.1–2)
ALP SERPL-CCNC: 66 UNIT/L (ref 40–150)
ALT SERPL-CCNC: 11 UNIT/L (ref 0–55)
AST SERPL-CCNC: 19 UNIT/L (ref 5–34)
BASOPHILS # BLD AUTO: 0.03 X10(3)/MCL
BASOPHILS NFR BLD AUTO: 0.6 %
BILIRUB SERPL-MCNC: 0.5 MG/DL
BUN SERPL-MCNC: 10.1 MG/DL (ref 9.8–20.1)
CALCIUM SERPL-MCNC: 8.4 MG/DL (ref 8.4–10.2)
CHLORIDE SERPL-SCNC: 109 MMOL/L (ref 98–107)
CO2 SERPL-SCNC: 25 MMOL/L (ref 23–31)
CREAT SERPL-MCNC: 0.59 MG/DL (ref 0.55–1.02)
CRP SERPL-MCNC: 80.3 MG/L
EOSINOPHIL # BLD AUTO: 0.25 X10(3)/MCL (ref 0–0.9)
EOSINOPHIL NFR BLD AUTO: 5 %
ERYTHROCYTE [DISTWIDTH] IN BLOOD BY AUTOMATED COUNT: 13.8 % (ref 11.5–17)
GFR SERPLBLD CREATININE-BSD FMLA CKD-EPI: >60 MLS/MIN/1.73/M2
GLOBULIN SER-MCNC: 2.4 GM/DL (ref 2.4–3.5)
GLUCOSE SERPL-MCNC: 95 MG/DL (ref 82–115)
HCT VFR BLD AUTO: 31.2 % (ref 37–47)
HGB BLD-MCNC: 10.6 G/DL (ref 12–16)
IMM GRANULOCYTES # BLD AUTO: 0.01 X10(3)/MCL (ref 0–0.04)
IMM GRANULOCYTES NFR BLD AUTO: 0.2 %
LYMPHOCYTES # BLD AUTO: 0.89 X10(3)/MCL (ref 0.6–4.6)
LYMPHOCYTES NFR BLD AUTO: 17.9 %
MCH RBC QN AUTO: 31.5 PG (ref 27–31)
MCHC RBC AUTO-ENTMCNC: 34 G/DL (ref 33–36)
MCV RBC AUTO: 92.9 FL (ref 80–94)
MONOCYTES # BLD AUTO: 0.67 X10(3)/MCL (ref 0.1–1.3)
MONOCYTES NFR BLD AUTO: 13.5 %
NEUTROPHILS # BLD AUTO: 3.12 X10(3)/MCL (ref 2.1–9.2)
NEUTROPHILS NFR BLD AUTO: 62.8 %
NRBC BLD AUTO-RTO: 0 %
PLATELET # BLD AUTO: 228 X10(3)/MCL (ref 130–400)
PMV BLD AUTO: 9.4 FL (ref 7.4–10.4)
POTASSIUM SERPL-SCNC: 3.5 MMOL/L (ref 3.5–5.1)
PREALB SERPL-MCNC: 19.2 MG/DL (ref 14–37)
PROT SERPL-MCNC: 5.6 GM/DL (ref 5.8–7.6)
RBC # BLD AUTO: 3.36 X10(6)/MCL (ref 4.2–5.4)
SODIUM SERPL-SCNC: 141 MMOL/L (ref 136–145)
WBC # SPEC AUTO: 4.97 X10(3)/MCL (ref 4.5–11.5)

## 2024-01-04 PROCEDURE — 25000003 PHARM REV CODE 250: Performed by: NURSE PRACTITIONER

## 2024-01-04 PROCEDURE — 80053 COMPREHEN METABOLIC PANEL: CPT | Performed by: NURSE PRACTITIONER

## 2024-01-04 PROCEDURE — 85025 COMPLETE CBC W/AUTO DIFF WBC: CPT | Performed by: NURSE PRACTITIONER

## 2024-01-04 PROCEDURE — 63600175 PHARM REV CODE 636 W HCPCS: Performed by: NURSE PRACTITIONER

## 2024-01-04 PROCEDURE — 86140 C-REACTIVE PROTEIN: CPT | Performed by: NURSE PRACTITIONER

## 2024-01-04 PROCEDURE — 99238 HOSP IP/OBS DSCHRG MGMT 30/<: CPT | Mod: ,,, | Performed by: SURGERY

## 2024-01-04 PROCEDURE — 84134 ASSAY OF PREALBUMIN: CPT | Performed by: NURSE PRACTITIONER

## 2024-01-04 PROCEDURE — 97116 GAIT TRAINING THERAPY: CPT | Mod: CQ

## 2024-01-04 RX ORDER — OXYCODONE HYDROCHLORIDE 5 MG/1
5 TABLET ORAL EVERY 4 HOURS PRN
Qty: 18 TABLET | Refills: 0 | Status: SHIPPED | OUTPATIENT
Start: 2024-01-04 | End: 2024-01-17 | Stop reason: SDUPTHER

## 2024-01-04 RX ORDER — METHOCARBAMOL 1000 MG/1
1000 TABLET, COATED ORAL EVERY 8 HOURS
Qty: 30 TABLET | Refills: 0 | Status: SHIPPED | OUTPATIENT
Start: 2024-01-04 | End: 2024-01-14

## 2024-01-04 RX ORDER — DULOXETIN HYDROCHLORIDE 20 MG/1
20 CAPSULE, DELAYED RELEASE ORAL 2 TIMES DAILY
Qty: 60 CAPSULE | Refills: 11 | Status: SHIPPED | OUTPATIENT
Start: 2024-01-04 | End: 2025-01-03

## 2024-01-04 RX ORDER — ASPIRIN 81 MG/1
81 TABLET ORAL DAILY
Qty: 30 TABLET | Refills: 0 | Status: SHIPPED | OUTPATIENT
Start: 2024-01-04 | End: 2024-01-17

## 2024-01-04 RX ADMIN — METHOCARBAMOL 1000 MG: 500 TABLET ORAL at 06:01

## 2024-01-04 RX ADMIN — DULOXETINE HYDROCHLORIDE 20 MG: 20 CAPSULE, DELAYED RELEASE ORAL at 09:01

## 2024-01-04 RX ADMIN — ACETAMINOPHEN 650 MG: 325 TABLET, FILM COATED ORAL at 06:01

## 2024-01-04 RX ADMIN — ACETAMINOPHEN 650 MG: 325 TABLET, FILM COATED ORAL at 09:01

## 2024-01-04 RX ADMIN — ATORVASTATIN CALCIUM 20 MG: 10 TABLET, FILM COATED ORAL at 09:01

## 2024-01-04 RX ADMIN — FOLIC ACID 1 MG: 1 TABLET ORAL at 09:01

## 2024-01-04 RX ADMIN — BUPROPION HYDROCHLORIDE 150 MG: 150 TABLET, EXTENDED RELEASE ORAL at 09:01

## 2024-01-04 RX ADMIN — KETOROLAC TROMETHAMINE 15 MG: 30 INJECTION, SOLUTION INTRAMUSCULAR; INTRAVENOUS at 06:01

## 2024-01-04 RX ADMIN — GABAPENTIN 300 MG: 300 CAPSULE ORAL at 09:01

## 2024-01-04 RX ADMIN — ENOXAPARIN SODIUM 40 MG: 100 INJECTION SUBCUTANEOUS at 09:01

## 2024-01-04 RX ADMIN — POLYETHYLENE GLYCOL 3350 17 G: 17 POWDER, FOR SOLUTION ORAL at 09:01

## 2024-01-04 RX ADMIN — DOCUSATE SODIUM 100 MG: 100 CAPSULE, LIQUID FILLED ORAL at 09:01

## 2024-01-04 RX ADMIN — THIAMINE HCL TAB 100 MG 100 MG: 100 TAB at 09:01

## 2024-01-04 RX ADMIN — KETOROLAC TROMETHAMINE 15 MG: 30 INJECTION, SOLUTION INTRAMUSCULAR; INTRAVENOUS at 12:01

## 2024-01-04 NOTE — PROGRESS NOTES
"Patient Name: Nazia Rodríguez  MRN: [id number]  Admission Date: 1/2/2024  Hospital Length of Stay: 2 days  Attending Provider: Royal Garrett MD  Primary Care Provider: Casimiro Kat II, MD  Follow-up For: Procedure(s) (LRB):  ORIF, FRACTURE, PILON (Right)    Post-Operative Day: 2 Days Post-Op  Subjective:       Principal Orthopedic Problem: 2 Days Post-Op       Interval History:  No acute issues reported overnight.  Patient is sleeping upon arrival the bedside this morning.  States that her pain is much better controlled today.  Ready for discharge home today.    Review of patient's allergies indicates:  No Known Allergies    Current Facility-Administered Medications   Medication    acetaminophen tablet 650 mg    ALPRAZolam tablet 0.25 mg    amitriptyline tablet 25 mg    atorvastatin tablet 20 mg    buPROPion TB24 tablet 150 mg    docusate sodium capsule 100 mg    DULoxetine DR capsule 20 mg    enoxaparin injection 40 mg    folic acid tablet 1 mg    gabapentin capsule 300 mg    ketorolac injection 15 mg    LORazepam tablet 1 mg    magnesium hydroxide 400 mg/5 ml suspension 2,400 mg    melatonin tablet 6 mg    methocarbamoL tablet 1,000 mg    multivitamin tablet    oxyCODONE immediate release tablet 5 mg    oxyCODONE immediate release tablet Tab 10 mg    polyethylene glycol packet 17 g    thiamine tablet 100 mg    zolpidem tablet 5 mg     Objective:     Vital Signs (Most Recent):  Temp: 98 °F (36.7 °C) (01/04/24 0746)  Pulse: 67 (01/04/24 0746)  Resp: 20 (01/04/24 0746)  BP: 125/67 (01/04/24 0746)  SpO2: (!) 94 % (01/04/24 0746) Vital Signs (24h Range):  Temp:  [97.6 °F (36.4 °C)-98.2 °F (36.8 °C)] 98 °F (36.7 °C)  Pulse:  [55-67] 67  Resp:  [16-20] 20  SpO2:  [93 %-98 %] 94 %  BP: (113-145)/(48-87) 125/67     Weight: 62.6 kg (138 lb 0.1 oz)  Height: 5' 4" (162.6 cm)  Body mass index is 23.69 kg/m².      Intake/Output Summary (Last 24 hours) at 1/4/2024 1037  Last data filed at 1/3/2024 2300  Gross per 24 " hour   Intake 680 ml   Output 800 ml   Net -120 ml       Physical Exam:   Ortho/SPM Exam    General the patient is alert and oriented x3 no acute distress nontoxic-appearing appropriate affect.    Constitutional: Vital signs are examined and stable.  Resp: No signs of labored breathing    Musculoskeletal Exam:  Right lower extremity:  Limb elevated.  Splint clean dry and intact.  No more complaints of any burning pain in the ankle.  Brisk capillary refill to all digits.  Moving her digits well, palpable DP pulse.  Lower limb compartments are soft and compressible.    Diagnostic Findings:   Significant Labs: BMP:   Recent Labs   Lab 01/03/24 0443 01/04/24 0457    141   K 3.7 3.5   CO2 23 25   BUN 9.9 10.1   CREATININE 0.63 0.59   CALCIUM 8.0* 8.4   MG 2.20  --      CBC:   Recent Labs   Lab 01/03/24 0443 01/04/24 0457   WBC 6.84 4.97   HGB 10.8* 10.6*   HCT 32.1* 31.2*    228     CMP:   Recent Labs   Lab 01/03/24 0443 01/04/24 0457    141   K 3.7 3.5   CO2 23 25   BUN 9.9 10.1   CREATININE 0.63 0.59   CALCIUM 8.0* 8.4   ALBUMIN 3.4 3.2*   BILITOT 0.5 0.5   ALKPHOS 71 66   AST 21 19   ALT 16 11     All pertinent labs within the past 24 hours have been reviewed.        Significant Imaging: I have reviewed and interpreted all pertinent imaging results/findings.     Assessment/Plan:     Active Diagnoses:    Diagnosis Date Noted POA    PRINCIPAL PROBLEM:  Closed right pilon fracture, initial encounter [S82.871A] 01/02/2024 Yes    Closed fracture of right tibia and fibula [S82.201A, S82.401A] 01/02/2024 Yes      Problems Resolved During this Admission:     H/H is stable today.  Her vital signs are stable and her pain is well-controlled.  She has been able to manage mobilizing and remaining nonweightbearing to the right lower extremity.  She can be discharged home today and follow-up with me in 2 weeks for removal of her sutures and transition into a boot.  She is happy with this plan of care and all  questions and concerns were addressed.      This note/OR report was created with the assistance of  voice recognition software or phone  dictation.  There may be transcription errors as a result of using this technology however minimal. Effort has been made to assure accuracy of transcription but any obvious errors or omissions should be clarified with the author of the document.         Zoran Sanchez MD  Orthopedic Trauma Surgery  [facility] - 8th Floor Med Surg

## 2024-01-04 NOTE — PLAN OF CARE
"Brief intervention completed with pt who did accept info "Rethinking Drinking" Pt was concerned why this info was being presented to her as ETOH had no bearing on her admit. Explained twice to her that we are a trauma hospital and when nursing asked her about her drinking pattern a score is generated by computer and lets us know who may like the info as a tool for self education. Encouraged her that she can also share the info with anyone else that she thinks may find it interesting.   "

## 2024-01-04 NOTE — PLAN OF CARE
Order for walker sent to [facility] via [facility]. Referral for outpatient therapy sent to [facility] via fax [phone]. Spoke to Cheryle with [facility] @ [phone] to notify her of patient referral.

## 2024-01-04 NOTE — HOSPITAL COURSE
68-year-old female trip and fall.  Found to have a tib-fib fracture.  Taken for open reduction internal fixation by Orthopedics.  Nonweightbearing to the right lower extremity.  Initially had poor pain control improved after some modifications.  We will be discharged with require DME and she will see outpatient physical therapy.  Follow up with Orthopedics in 2 weeks as scheduled.  Patient was on chronic benzodiazepine therapy and was on these here along with opiate therapy.  Patient was opiate dosage we will be decreased on discharge for safety.  She will follow up with the primary care provider

## 2024-01-04 NOTE — PT/OT/SLP PROGRESS
Physical Therapy Treatment    Patient Name:  Nazia Rodríguez   MRN:  [id number]    Recommendations:     Discharge therapy intensity: Low Intensity Therapy   Discharge Equipment Recommendations: walker, rolling  Barriers to discharge: None    Assessment:     Nazia Rodríguez is a 68 y.o. female admitted with a medical diagnosis of Closed right pilon fracture, initial encounter.  She presents with the following impairments/functional limitations: weakness, impaired self care skills, decreased lower extremity function, decreased safety awareness, pain .    Rehab Prognosis: Good; patient would benefit from acute skilled PT services to address these deficits and reach maximum level of function.    Recent Surgery: Procedure(s) (LRB):  ORIF, FRACTURE, PILON (Right) 2 Days Post-Op    Plan:     During this hospitalization, patient to be seen 6 x/week to address the identified rehab impairments via gait training, therapeutic activities, therapeutic exercises, neuromuscular re-education and progress toward the following goals:    Plan of Care Expires:  02/03/24    Subjective     Chief Complaint:   Patient/Family Comments/goals:   Pain/Comfort:  Pain Rating 1: 0/10      Objective:     Communicated with NSG prior to session.  Patient found up in chair with telemetry upon PT entry to room.     General Precautions: Standard, fall  Orthopedic Precautions: RLE non weight bearing  Braces: N/A  Respiratory Status: Room air  Blood Pressure:   Skin Integrity: Visible skin intact      Functional Mobility:  Transfers:     Sit to Stand:  modified independence with rolling walker and 1 trial from bedside chair   Gait: pt amb 62ft with RW SBA. Pt with hop-through gait pattern.       Patient left up in chair with all lines intact and call button in reach..    GOALS:   Multidisciplinary Problems       Physical Therapy Goals          Problem: Physical Therapy    Goal Priority Disciplines Outcome Goal Variances Interventions   Physical  Therapy Goal     PT, PT/OT Ongoing, Progressing     Description: Goals to be met by: 2/3/24     Patient will increase functional independence with mobility by performin. Supine to sit with [place]  2. Sit to supine with [place]  3. Sit to stand transfer with [place]  4. Gait  x 200 feet with Modified [place] using Rolling Walker.                          Time Tracking:     PT Received On: 24  PT Start Time: 1132     PT Stop Time: 1147  PT Total Time (min): 15 min     Billable Minutes: Gait Training 15    Treatment Type: Treatment  PT/PTA: PTA     Number of PTA visits since last PT visit: 2024

## 2024-01-04 NOTE — NURSING
Discharge instructions reviewed with patient. Questions encouraged and answered. Patient verbalized understanding of all instructions. Patient waiting on family for transport home.

## 2024-01-04 NOTE — DISCHARGE SUMMARY
[facility] - 8th Floor Med Surg  Trauma  Discharge Summary      Patient Name: Nazia Rodríguez  MRN: [id number]  Admission Date: 1/2/2024  Hospital Length of Stay: 2 days  Discharge Date and Time:  01/04/2024 6:59 AM  Attending Physician: Royal Garrett MD   Discharging Provider: BENEDICTO Hernandez  Primary Care Provider: Casimiro Kat II, MD    HPI:   68-year-old female by report tripped and fell and was found to have a tib-fib fracture.  Known to Dr. Sanchez from a previous trauma approximately 2 years ago where she suffered head bleed, cervical fractures, aortic dissection requiring intervention.  Lead tib-fib fractures of the right.  She was finishing her operative course with Orthopedics at this time and she has been evaluated by our team preoperatively.  She was GCS 15.  No other injuries were found.  No other history is known other than surgical history of hysterectomy in the past.  Hemodynamically stable.      Procedure(s) (LRB):  ORIF, FRACTURE, PILON (Right)      Indwelling Lines/Drains at time of discharge:   Lines/Drains/Airways       None                 Hospital Course: 68-year-old female trip and fall.  Found to have a tib-fib fracture.  Taken for open reduction internal fixation by Orthopedics.  Nonweightbearing to the right lower extremity.  Initially had poor pain control improved after some modifications.  We will be discharged with require DME and she will see outpatient physical therapy.  Follow up with Orthopedics in 2 weeks as scheduled.  Patient was on chronic benzodiazepine therapy and was on these here along with opiate therapy.  Patient was opiate dosage we will be decreased on discharge for safety.  She will follow up with the primary care provider    Goals of Care Treatment Preferences:  Code Status: Full Code      Consults:   Consults (From admission, onward)          Status Ordering Provider     Inpatient consult to Orthopedic Surgery  Once        Provider:   Zoran Sanchez MD    Acknowledged MABEL VICENTE            Significant Diagnostic Studies: N/A    Pending Diagnostic Studies:       None          Final Active Diagnoses:    Diagnosis Date Noted POA    PRINCIPAL PROBLEM:  Closed right pilon fracture, initial encounter [S82.871A] 01/02/2024 Yes    Closed fracture of right tibia and fibula [S82.201A, S82.401A] 01/02/2024 Yes      Problems Resolved During this Admission:      Discharged Condition: good    Disposition: Home or Self Care    Follow Up:   Follow-up Information       Zoran Sanchez MD Follow up.    Specialty: Orthopedic Surgery  Why: Need followup in 2 weeks for suture removal and transition to boot, s/p ORIF R ankle  Contact information:  [address] [facility] [address] [phone]                           Patient Instructions:      Weight bearing restrictions (specify):   Order Comments: Nonweightbearing right lower extremity until cleared by Orthopedics     Medications:  Reconciled Home Medications:      Medication List        START taking these medications      DULoxetine 20 MG capsule  Commonly known as: CYMBALTA  Take 1 capsule (20 mg total) by mouth 2 (two) times daily.     methocarbamoL 1,000 mg Tab  Take 1,000 mg by mouth every 8 (eight) hours. for 10 days     oxyCODONE 5 MG immediate release tablet  Commonly known as: ROXICODONE  Take 1 tablet (5 mg total) by mouth every 4 (four) hours as needed for Pain.            CHANGE how you take these medications      * aspirin 81 MG EC tablet  Commonly known as: ECOTRIN  Take 1 tablet (81 mg total) by mouth once daily.  What changed: You were already taking a medication with the same name, and this prescription was added. Make sure you understand how and when to take each.     * aspirin 81 MG EC tablet  Commonly known as: ECOTRIN  Take 81 mg by mouth once daily.  What changed: Another medication with the same name was added. Make sure you understand how and when to take  each.           * This list has 2 medication(s) that are the same as other medications prescribed for you. Read the directions carefully, and ask your doctor or other care provider to review them with you.                CONTINUE taking these medications      ALPRAZolam 0.5 MG tablet  Commonly known as: XANAX  TAKE ONE TABLET ONCE DAILY AS NEEDED FOR ANXIETY     amitriptyline 25 MG tablet  Commonly known as: ELAVIL  Take 1 tablet (25 mg total) by mouth nightly as needed for Insomnia.     buPROPion 150 MG TB24 tablet  Commonly known as: WELLBUTRIN XL  Take 1 tablet (150 mg total) by mouth once daily.     naltrexone 50 mg tablet  Commonly known as: DEPADE  TAKE ONE TABLET BY MOUTH ONCE DAILY AND AS NEEDED FOR craving. maximum OF 2 TABLETS PER DAY.     nystatin powder  Commonly known as: MYCOSTATIN  Apply topically 4 (four) times daily.     rosuvastatin 5 MG tablet  Commonly known as: CRESTOR  Take 1 tablet (5 mg total) by mouth once daily.     * triazolam 0.25 MG Tab  Commonly known as: HALCION  Take 1 tablet (0.25 mg total) by mouth nightly as needed (insomnia). FOR INSOMNIA     * triazolam 0.25 MG Tab  Commonly known as: HALCION  TAKE ONE TABLET BY MOUTH NIGHTLY AS NEEDED FOR INSOMNIA           * This list has 2 medication(s) that are the same as other medications prescribed for you. Read the directions carefully, and ask your doctor or other care provider to review them with you.                Time spent on the discharge of patient: 25 minutes    BENEDICTO Hernandez  Trauma  [facility] - 8th Floor Med Surg

## 2024-01-05 ENCOUNTER — PATIENT OUTREACH (OUTPATIENT)
Dept: ADMINISTRATIVE | Facility: CLINIC | Age: 69
End: 2024-01-05
Payer: COMMERCIAL

## 2024-01-05 ENCOUNTER — PATIENT MESSAGE (OUTPATIENT)
Dept: ADMINISTRATIVE | Facility: CLINIC | Age: 69
End: 2024-01-05
Payer: COMMERCIAL

## 2024-01-05 NOTE — PROGRESS NOTES
C3 nurse attempted to contact Nazia Rodríguez for a TCC post hospital discharge follow up call. No answer. Left voicemail with callback information. The patient does not have a scheduled HOSFU appointment. Message sent to PCP staff for assistance with scheduling visit with patient.  The patient does have a POST OP appt with Zoran Sanchez MD (Orthopedic Surgery) on 1/17/2024; @ 8:15.

## 2024-01-05 NOTE — PROGRESS NOTES
C3 nurse spoke with Nazia Rodríguez for a TCC post hospital discharge follow up call. The patient does not have a scheduled HOSFU appointment with Casimiro Kat II, MD within 5-7 days post hospital discharge date 1/4/24. C3 nurse was unable to schedule HOSFU appointment in [facility].    Message sent to PCP staff requesting they contact patient and schedule follow up appointment.  The patient does have a POST OP appt with Zoran Sanchez MD (Orthopedic Surgery) on 1/17/2024; @ 8:15.

## 2024-01-09 RX ORDER — VALACYCLOVIR HYDROCHLORIDE 1 G/1
1000 TABLET, FILM COATED ORAL 2 TIMES DAILY
Qty: 14 TABLET | Refills: 1 | Status: SHIPPED | OUTPATIENT
Start: 2024-01-09

## 2024-01-10 DIAGNOSIS — F32.0 CURRENT MILD EPISODE OF MAJOR DEPRESSIVE DISORDER WITHOUT PRIOR EPISODE: ICD-10-CM

## 2024-01-10 RX ORDER — BUPROPION HYDROCHLORIDE 150 MG/1
150 TABLET ORAL
Qty: 30 TABLET | Refills: 5 | Status: SHIPPED | OUTPATIENT
Start: 2024-01-10

## 2024-01-17 ENCOUNTER — OFFICE VISIT (OUTPATIENT)
Dept: INTERNAL MEDICINE | Facility: CLINIC | Age: 69
End: 2024-01-17
Payer: MEDICARE

## 2024-01-17 ENCOUNTER — OFFICE VISIT (OUTPATIENT)
Dept: ORTHOPEDICS | Facility: CLINIC | Age: 69
End: 2024-01-17
Payer: MEDICARE

## 2024-01-17 VITALS
WEIGHT: 138 LBS | DIASTOLIC BLOOD PRESSURE: 73 MMHG | SYSTOLIC BLOOD PRESSURE: 133 MMHG | HEIGHT: 64 IN | HEART RATE: 66 BPM | BODY MASS INDEX: 23.56 KG/M2

## 2024-01-17 VITALS
DIASTOLIC BLOOD PRESSURE: 80 MMHG | WEIGHT: 138 LBS | HEART RATE: 76 BPM | RESPIRATION RATE: 18 BRPM | BODY MASS INDEX: 23.56 KG/M2 | SYSTOLIC BLOOD PRESSURE: 138 MMHG | HEIGHT: 64 IN | OXYGEN SATURATION: 97 %

## 2024-01-17 DIAGNOSIS — Z09 HOSPITAL DISCHARGE FOLLOW-UP: Primary | ICD-10-CM

## 2024-01-17 DIAGNOSIS — S82.874D CLOSED NONDISPLACED PILON FRACTURE OF RIGHT TIBIA WITH ROUTINE HEALING, SUBSEQUENT ENCOUNTER: Primary | ICD-10-CM

## 2024-01-17 DIAGNOSIS — S82.871A CLOSED RIGHT PILON FRACTURE, INITIAL ENCOUNTER: Primary | ICD-10-CM

## 2024-01-17 DIAGNOSIS — Z09 FOLLOW-UP EXAM: ICD-10-CM

## 2024-01-17 PROCEDURE — 99213 OFFICE O/P EST LOW 20 MIN: CPT | Mod: ,,, | Performed by: INTERNAL MEDICINE

## 2024-01-17 PROCEDURE — 99024 POSTOP FOLLOW-UP VISIT: CPT | Mod: POP,,, | Performed by: ORTHOPAEDIC SURGERY

## 2024-01-17 RX ORDER — OXYCODONE HYDROCHLORIDE 5 MG/1
5 TABLET ORAL EVERY 4 HOURS PRN
Qty: 18 TABLET | Refills: 0 | Status: SHIPPED | OUTPATIENT
Start: 2024-01-17 | End: 2024-06-13

## 2024-01-17 RX ORDER — METHOCARBAMOL 500 MG/1
500 TABLET, FILM COATED ORAL 3 TIMES DAILY PRN
Qty: 30 TABLET | Refills: 0 | Status: SHIPPED | OUTPATIENT
Start: 2024-01-17 | End: 2024-01-27

## 2024-01-17 NOTE — PROGRESS NOTES
Subjective:       Patient ID: Nazia Rodríguez is a 68 y.o. female.    Chief Complaint   Patient presents with    Right Ankle - Post-op Evaluation     2 week f/u from ORIF right pilon fx. Present in splint. Denies increase in pain or discomfort. Currently in outpatient physical therapy.         Patient is 2 weeks status post open reduction internal fixation of right intra-articular distal tibia fracture.  She is doing well today.  She has been compliant with her nonweightbearing status to the right lower extremity.  She states that her pain has been well controlled.  She reports no fevers or chills or any drainage from her wounds.        Review of Systems   Constitutional: Negative for chills, fever and malaise/fatigue.   HENT:  Negative for congestion and hearing loss.    Eyes:  Negative for visual disturbance.   Cardiovascular:  Negative for chest pain and syncope.   Respiratory:  Negative for cough and shortness of breath.    Hematologic/Lymphatic: Does not bruise/bleed easily.   Skin:  Negative for color change and suspicious lesions.   Musculoskeletal:  Negative for falls and neck pain.   Gastrointestinal:  Negative for abdominal pain, nausea and vomiting.   Genitourinary:  Negative for dysuria and hematuria.   Neurological:  Negative for numbness and sensory change.   Psychiatric/Behavioral:  Negative for altered mental status. The patient is not nervous/anxious.         Current Outpatient Medications on File Prior to Visit   Medication Sig Dispense Refill    DULoxetine (CYMBALTA) 20 MG capsule Take 1 capsule (20 mg total) by mouth 2 (two) times daily. 60 capsule 11    [DISCONTINUED] aspirin (ECOTRIN) 81 MG EC tablet Take 1 tablet (81 mg total) by mouth once daily. 30 tablet 0    [DISCONTINUED] rosuvastatin (CRESTOR) 5 MG tablet Take 1 tablet (5 mg total) by mouth once daily. 90 tablet 3    ALPRAZolam (XANAX) 0.5 MG tablet TAKE ONE TABLET ONCE DAILY AS NEEDED FOR ANXIETY 30 tablet 2    aspirin (ECOTRIN) 81  "MG EC tablet Take 81 mg by mouth once daily.      buPROPion (WELLBUTRIN XL) 150 MG TB24 tablet TAKE ONE TABLET ONCE DAILY 30 tablet 5    naltrexone (DEPADE) 50 mg tablet TAKE ONE TABLET BY MOUTH ONCE DAILY AND AS NEEDED FOR craving. maximum OF 2 TABLETS PER DAY. (Patient not taking: Reported on 1/17/2024) 30 tablet 2    nystatin (MYCOSTATIN) powder Apply topically 4 (four) times daily. (Patient not taking: Reported on 1/5/2024) 60 g 1    triazolam (HALCION) 0.25 MG Tab Take 1 tablet (0.25 mg total) by mouth nightly as needed (insomnia). FOR INSOMNIA 90 tablet 0    valACYclovir (VALTREX) 1000 MG tablet TAKE ONE TABLET TWICE A DAY 14 tablet 1    [DISCONTINUED] amitriptyline (ELAVIL) 25 MG tablet Take 1 tablet (25 mg total) by mouth nightly as needed for Insomnia. (Patient not taking: Reported on 1/5/2024) 30 tablet 3    [DISCONTINUED] oxyCODONE (ROXICODONE) 5 MG immediate release tablet Take 1 tablet (5 mg total) by mouth every 4 (four) hours as needed for Pain. (Patient not taking: Reported on 1/17/2024) 18 tablet 0    [DISCONTINUED] triazolam (HALCION) 0.25 MG Tab TAKE ONE TABLET BY MOUTH NIGHTLY AS NEEDED FOR INSOMNIA (Patient not taking: Reported on 1/17/2024) 90 tablet 0     No current facility-administered medications on file prior to visit.          Objective:      /73   Pulse 66   Ht 5' 4" (1.626 m)   Wt 62.6 kg (138 lb)   BMI 23.69 kg/m²   Physical Exam  Constitutional:       General: She is not in acute distress.     Appearance: Normal appearance. She is not ill-appearing.   HENT:      Head: Normocephalic and atraumatic.      Nose: No congestion.   Eyes:      Extraocular Movements: Extraocular movements intact.   Cardiovascular:      Rate and Rhythm: Normal rate and regular rhythm.      Pulses: Normal pulses.   Pulmonary:      Effort: Pulmonary effort is normal.      Breath sounds: Normal breath sounds.   Abdominal:      General: There is no distension.      Palpations: Abdomen is soft.      " Tenderness: There is no abdominal tenderness.   Musculoskeletal:      Comments: Right lower extremity:  Surgical incisions are all well healed.  No painful or prominent hardware.  Palpable DP pulse.  No calf swelling or tenderness, no signs of DVT.  She is got some stiffness with range motion of the ankle as expected.  No tenderness to palpation around the knee, full range motion of the right knee.   Skin:     General: Skin is warm and dry.   Neurological:      Mental Status: She is alert and oriented to person, place, and time. Mental status is at baseline.   Psychiatric:         Mood and Affect: Mood normal.         Behavior: Behavior normal.         Thought Content: Thought content normal.         Judgment: Judgment normal.        Body mass index is 23.69 kg/m².    Radiology:         Assessment:         1. Closed right pilon fracture, initial encounter                Plan:       She is doing very well today and I am happy with her progress.  We will transition her into a boot today, continue nonweightbearing to the right lower extremity.  Continue to leave the limb elevated while at rest.  She is okay to use a knee roller if she tolerates this and we will have her continue to work with physical therapy.  She will return to see me in a month at which time we will get new x-rays and begin a progressive weight-bearing protocol to the right lower extremity.  She is happy with this plan of care today and all questions and concerns were addressed.      This note/OR report was created with the assistance of  voice recognition software or phone  dictation.  There may be transcription errors as a result of using this technology however minimal. Effort has been made to assure accuracy of transcription but any obvious errors or omissions should be clarified with the author of the document.       Zoran Sanchez MD  Orthopedic Trauma  [facility]      Follow up in about 4 weeks (around 2/14/2024).    Closed  right pilon fracture, initial encounter              No orders of the defined types were placed in this encounter.      Future Appointments   Date Time Provider Department Center   2/14/2024  8:30 AM Zoran Sanchez MD [facility]   4/9/2024  1:20 PM Casimiro Kat II, MD [facility] [id number] IM [facility]   6/13/2024  8:20 AM Johnny Hansen MD [facility] CRDVSRG H&V [facility]   7/22/2024  9:20 AM Mike Dai MD [facility] GBR Nancy Cline

## 2024-01-17 NOTE — PROGRESS NOTES
Patient ID: Nazia Rodríguez is a 68 y.o. female.    Chief Complaint: Follow-up (Recent fall)      HPI:   Patient presents here today for above reason.     This 68-year-old female presenting today in follow-up.  Was recently hospitalized after an accidental fall at home she tripped over the edge of her bed and had a fem tib fracture.  Status post ORIF.  Otherwise doing well.  Pain is controlled going through physical therapy.    The patient's Health Maintenance was reviewed and the following appears to be due at this time:   Health Maintenance Due   Topic Date Due    Hepatitis C Screening  Never done    Pneumococcal Vaccines (Age 65+) (1 - PCV) Never done    RSV Vaccine (Age 60+ and Pregnant patients) (1 - 1-dose 60+ series) Never done    Influenza Vaccine (1) 2023    COVID-19 Vaccine (3 - - season) 2023        Past Medical History:  Past Medical History:   Diagnosis Date    Anxiety disorder, unspecified     Claustrophobia     Depression     Descending thoracic aortic dissection     High cholesterol     Insomnia     Mitral valve prolapse     Obesity, unspecified     Personal history of colonic polyps      Past Surgical History:   Procedure Laterality Date    CATARACT EXTRACTION       SECTION      HYSTERECTOMY      NECK SURGERY      OPEN REDUCTION AND INTERNAL FIXATION (ORIF) OF PILON FRACTURE Right 2024    Procedure: ORIF, FRACTURE, PILON;  Surgeon: Zoran Sanchez MD;  Location: [facility];  Service: Orthopedics;  Laterality: Right;  EX-FIX -VS- ORIF RIGHT ANKLE //  SPINE // VASC // BONE FOAM // SYNTHES     Review of patient's allergies indicates:  No Known Allergies  Current Outpatient Medications on File Prior to Visit   Medication Sig Dispense Refill    ALPRAZolam (XANAX) 0.5 MG tablet TAKE ONE TABLET ONCE DAILY AS NEEDED FOR ANXIETY 30 tablet 2    aspirin (ECOTRIN) 81 MG EC tablet Take 81 mg by mouth once daily.      buPROPion (WELLBUTRIN XL) 150 MG TB24 tablet TAKE ONE TABLET  ONCE DAILY 30 tablet 5    DULoxetine (CYMBALTA) 20 MG capsule Take 1 capsule (20 mg total) by mouth 2 (two) times daily. 60 capsule 11    triazolam (HALCION) 0.25 MG Tab Take 1 tablet (0.25 mg total) by mouth nightly as needed (insomnia). FOR INSOMNIA 90 tablet 0    valACYclovir (VALTREX) 1000 MG tablet TAKE ONE TABLET TWICE A DAY 14 tablet 1    [DISCONTINUED] rosuvastatin (CRESTOR) 5 MG tablet Take 1 tablet (5 mg total) by mouth once daily. 90 tablet 3    naltrexone (DEPADE) 50 mg tablet TAKE ONE TABLET BY MOUTH ONCE DAILY AND AS NEEDED FOR craving. maximum OF 2 TABLETS PER DAY. (Patient not taking: Reported on 1/17/2024) 30 tablet 2    nystatin (MYCOSTATIN) powder Apply topically 4 (four) times daily. (Patient not taking: Reported on 1/5/2024) 60 g 1    [DISCONTINUED] amitriptyline (ELAVIL) 25 MG tablet Take 1 tablet (25 mg total) by mouth nightly as needed for Insomnia. (Patient not taking: Reported on 1/5/2024) 30 tablet 3    [DISCONTINUED] aspirin (ECOTRIN) 81 MG EC tablet Take 1 tablet (81 mg total) by mouth once daily. 30 tablet 0    [DISCONTINUED] oxyCODONE (ROXICODONE) 5 MG immediate release tablet Take 1 tablet (5 mg total) by mouth every 4 (four) hours as needed for Pain. (Patient not taking: Reported on 1/17/2024) 18 tablet 0    [DISCONTINUED] triazolam (HALCION) 0.25 MG Tab TAKE ONE TABLET BY MOUTH NIGHTLY AS NEEDED FOR INSOMNIA (Patient not taking: Reported on 1/17/2024) 90 tablet 0     No current facility-administered medications on file prior to visit.     Social History     Socioeconomic History    Marital status: Single   Tobacco Use    Smoking status: Former     Current packs/day: 1.50     Average packs/day: 1.5 packs/day for 30.0 years (45.0 ttl pk-yrs)     Types: Cigarettes    Smokeless tobacco: Never    Tobacco comments:     Quit 20 years ago   Substance and Sexual Activity    Alcohol use: Yes    Drug use: Never    Sexual activity: Never     Family History   Problem Relation Age of Onset    No  "Known Problems Mother     No Known Problems Father     Heart attack Neg Hx     Fainting Neg Hx     Heart disease Neg Hx     Heart failure Neg Hx     Hyperlipidemia Neg Hx     Hypertension Neg Hx     Stroke Neg Hx     Atrial Septal Defect Neg Hx        ROS:   Comprehensive review of systems was performed and is negative except as noted above    Vitals/PE:   /80 (BP Location: Left arm, Patient Position: Sitting)   Pulse 76   Resp 18   Ht 5' 4" (1.626 m)   Wt 62.6 kg (138 lb)   SpO2 97%   BMI 23.69 kg/m²   Physical Exam    General: Alert and oriented, No acute distress.   Eye: Normal conjunctiva without exudate.  HENMT: Normocephalic/AT, Normal hearing, Oral mucosa is moist and pink   Neck: No goiter visualized.   Respiratory: Lungs CTAB, Respirations are non-labored, Breath sounds are equal, Symmetrical chest wall expansion.  Cardiovascular: Normal rate, Regular rhythm, No murmur, No edema.   Gastrointestinal: Non-distended.   Genitourinary: Deferred.  Musculoskeletal: Normal ROM, Normal gait, No deformities or amputations.  Integumentary: Warm, Dry, Intact. No diaphoresis, or flushing.  Neurologic: No focal deficits, Cranial Nerves II-XII are grossly intact.   Psychiatric: Cooperative, Appropriate mood & affect, Normal judgment, Non-suicidal.    Assessment/Plan:       1. Hospital discharge follow-up    2. Follow-up exam         Plan:  Cont current rx.  Was placed on Cymbalta for pain control.  Continue this for right now will wean off in the future.  Continue with physical therapy she is nonweightbearing.  Pain is controlled continue with current therapy    Education and counseling done face to face regarding medical conditions and plan. Contact office if new symptoms develop. Should any symptoms ever significantly worsen seek emergency medical attention/go to ER. Follow up at least yearly for wellness or sooner PRN. Nurse to call patient with any results. The patient is receptive, expresses understanding " and is agreeable to plan. All questions have been answered.    No follow-ups on file.

## 2024-01-23 DIAGNOSIS — S82.871A CLOSED RIGHT PILON FRACTURE, INITIAL ENCOUNTER: Primary | ICD-10-CM

## 2024-02-14 ENCOUNTER — HOSPITAL ENCOUNTER (OUTPATIENT)
Dept: RADIOLOGY | Facility: CLINIC | Age: 69
Discharge: HOME | End: 2024-02-14
Attending: ORTHOPAEDIC SURGERY
Payer: MEDICARE

## 2024-02-14 ENCOUNTER — OFFICE VISIT (OUTPATIENT)
Dept: ORTHOPEDICS | Facility: CLINIC | Age: 69
End: 2024-02-14
Payer: MEDICARE

## 2024-02-14 VITALS
BODY MASS INDEX: 23.69 KG/M2 | SYSTOLIC BLOOD PRESSURE: 135 MMHG | DIASTOLIC BLOOD PRESSURE: 77 MMHG | HEIGHT: 64 IN | HEART RATE: 67 BPM

## 2024-02-14 DIAGNOSIS — S82.871D CLOSED DISPLACED PILON FRACTURE OF RIGHT TIBIA WITH ROUTINE HEALING: Primary | ICD-10-CM

## 2024-02-14 DIAGNOSIS — S82.871D CLOSED DISPLACED PILON FRACTURE OF RIGHT TIBIA WITH ROUTINE HEALING: ICD-10-CM

## 2024-02-14 PROCEDURE — 99024 POSTOP FOLLOW-UP VISIT: CPT | Mod: POP,,, | Performed by: ORTHOPAEDIC SURGERY

## 2024-02-14 PROCEDURE — 73610 X-RAY EXAM OF ANKLE: CPT | Mod: RT,,, | Performed by: ORTHOPAEDIC SURGERY

## 2024-02-14 NOTE — PROGRESS NOTES
Subjective:       Patient ID: Nazia Rodríguez is a 68 y.o. female.    Chief Complaint   Patient presents with    Right Lower Leg - Follow-up     6wks out ORIF Rt surya Cotton Sx Sx 1/2/24-GL 4/1/24. Patient state she feel s good and is in PT 3xs a wk. She has a boot on today with a knee scooter.         Patient is here today for follow-up evaluation 6 weeks status post open reduction internal fixation of right intra-articular distal tibia fracture.  She is doing well.  She has been nonweightbearing to the right lower extremity.  She gets around well on a knee scooter.  She has been working with range motion and strengthening exercises at physical therapy.  She is happy with her progress.    Follow-up  Pertinent negatives include no abdominal pain, chest pain, chills, congestion, coughing, fever, nausea, neck pain, numbness or vomiting.       Review of Systems   Constitutional: Negative for chills, fever and malaise/fatigue.   HENT:  Negative for congestion and hearing loss.    Eyes:  Negative for visual disturbance.   Cardiovascular:  Negative for chest pain and syncope.   Respiratory:  Negative for cough and shortness of breath.    Hematologic/Lymphatic: Does not bruise/bleed easily.   Skin:  Negative for color change and suspicious lesions.   Musculoskeletal:  Negative for falls and neck pain.   Gastrointestinal:  Negative for abdominal pain, nausea and vomiting.   Genitourinary:  Negative for dysuria and hematuria.   Neurological:  Negative for numbness and sensory change.   Psychiatric/Behavioral:  Negative for altered mental status. The patient is not nervous/anxious.         Current Outpatient Medications on File Prior to Visit   Medication Sig Dispense Refill    ALPRAZolam (XANAX) 0.5 MG tablet TAKE ONE TABLET ONCE DAILY AS NEEDED FOR ANXIETY 30 tablet 2    aspirin (ECOTRIN) 81 MG EC tablet Take 81 mg by mouth once daily.      buPROPion (WELLBUTRIN XL) 150 MG TB24 tablet TAKE ONE TABLET ONCE DAILY 30  "tablet 5    DULoxetine (CYMBALTA) 20 MG capsule Take 1 capsule (20 mg total) by mouth 2 (two) times daily. 60 capsule 11    naltrexone (DEPADE) 50 mg tablet TAKE ONE TABLET BY MOUTH ONCE DAILY AND AS NEEDED FOR craving. maximum OF 2 TABLETS PER DAY. 30 tablet 2    nystatin (MYCOSTATIN) powder Apply topically 4 (four) times daily. 60 g 1    triazolam (HALCION) 0.25 MG Tab Take 1 tablet (0.25 mg total) by mouth nightly as needed (insomnia). FOR INSOMNIA 90 tablet 0    valACYclovir (VALTREX) 1000 MG tablet TAKE ONE TABLET TWICE A DAY 14 tablet 1    oxyCODONE (ROXICODONE) 5 MG immediate release tablet Take 1 tablet (5 mg total) by mouth every 4 (four) hours as needed for Pain. (Patient not taking: Reported on 2/14/2024) 18 tablet 0     No current facility-administered medications on file prior to visit.          Objective:      BP (!) 142/85 (BP Location: Left arm, Patient Position: Sitting, BP Method: Medium (Automatic))   Pulse 91   Ht 5' 4" (1.626 m)   BMI 23.69 kg/m²   Physical Exam  Constitutional:       General: She is not in acute distress.     Appearance: Normal appearance. She is not ill-appearing.   HENT:      Head: Normocephalic and atraumatic.      Nose: No congestion.   Eyes:      Extraocular Movements: Extraocular movements intact.   Cardiovascular:      Rate and Rhythm: Normal rate and regular rhythm.      Pulses: Normal pulses.   Pulmonary:      Effort: Pulmonary effort is normal.      Breath sounds: Normal breath sounds.   Abdominal:      General: There is no distension.      Palpations: Abdomen is soft.      Tenderness: There is no abdominal tenderness.   Musculoskeletal:      Comments: Right lower extremity:  Surgical incisions are all well healed.  No painful or prominent hardware.  No calf swelling or tenderness, no signs of DVT.  Palpable DP pulse.  Sensation to light touch intact distally.  Good range motion of the ankle and digits.   Skin:     General: Skin is warm and dry.   Neurological:     "  Mental Status: She is alert and oriented to person, place, and time. Mental status is at baseline.   Psychiatric:         Mood and Affect: Mood normal.         Behavior: Behavior normal.         Thought Content: Thought content normal.         Judgment: Judgment normal.        Body mass index is 23.69 kg/m².    Radiology:   Right ankle three views:  Hardware intact.  Alignment maintained.  Evidence of callus formation noted.      Assessment:         1. Closed displaced pilon fracture of right tibia with routine healing  X-Ray Ankle Complete Right              Plan:       She is doing well today and I am happy with the progress.  She will continue to work on strengthening and range motion exercises.  We will provide her with updated orders for physical therapy in order for her to begin a progressive weight-bearing protocol starting next week.  She will come back to see me in 6 weeks for repeat x-rays of the right ankle.  She is happy with this plan of care and all questions and concerns were addressed.      This note/OR report was created with the assistance of  voice recognition software or phone  dictation.  There may be transcription errors as a result of using this technology however minimal. Effort has been made to assure accuracy of transcription but any obvious errors or omissions should be clarified with the author of the document.       Zoran Sanchez MD  Orthopedic Trauma  [facility]      Follow up in about 6 weeks (around 3/27/2024).    Closed displaced pilon fracture of right tibia with routine healing  -     X-Ray Ankle Complete Right; Future; Expected date: 02/14/2024              Orders Placed This Encounter   Procedures    X-Ray Ankle Complete Right     Standing Status:   Future     Number of Occurrences:   1     Standing Expiration Date:   2/12/2025     Order Specific Question:   May the Radiologist modify the order per protocol to meet the clinical needs of the patient?      Answer:   Yes     Order Specific Question:   Release to patient     Answer:   Immediate       Future Appointments   Date Time Provider Department Center   4/9/2024  1:20 PM Casimiro Kat II, MD [facility] 461MDAC [facility]   6/13/2024  8:20 AM Johnny Hansen MD [facility] CRDVSRG [facility]   7/22/2024  9:20 AM [facility]

## 2024-03-17 ENCOUNTER — PATIENT MESSAGE (OUTPATIENT)
Dept: CARDIOLOGY | Facility: CLINIC | Age: 69
End: 2024-03-17
Payer: COMMERCIAL

## 2024-03-21 ENCOUNTER — OFFICE VISIT (OUTPATIENT)
Dept: CARDIOLOGY | Facility: CLINIC | Age: 69
End: 2024-03-21
Payer: MEDICARE

## 2024-03-21 VITALS
WEIGHT: 148 LBS | DIASTOLIC BLOOD PRESSURE: 71 MMHG | SYSTOLIC BLOOD PRESSURE: 134 MMHG | BODY MASS INDEX: 25.27 KG/M2 | HEIGHT: 64 IN | RESPIRATION RATE: 20 BRPM | HEART RATE: 64 BPM

## 2024-03-21 DIAGNOSIS — I73.9 PVD (PERIPHERAL VASCULAR DISEASE): ICD-10-CM

## 2024-03-21 DIAGNOSIS — I25.10 ATHEROSCLEROSIS OF NATIVE CORONARY ARTERY OF NATIVE HEART WITHOUT ANGINA PECTORIS: Primary | ICD-10-CM

## 2024-03-21 DIAGNOSIS — E78.49 OTHER HYPERLIPIDEMIA: ICD-10-CM

## 2024-03-21 DIAGNOSIS — R07.9 CHEST PAIN, UNSPECIFIED TYPE: ICD-10-CM

## 2024-03-21 DIAGNOSIS — I71.012 DESCENDING THORACIC AORTIC DISSECTION: ICD-10-CM

## 2024-03-21 PROCEDURE — 99214 OFFICE O/P EST MOD 30 MIN: CPT | Mod: S$PBB,,, | Performed by: INTERNAL MEDICINE

## 2024-03-21 PROCEDURE — 99213 OFFICE O/P EST LOW 20 MIN: CPT | Mod: PBBFAC | Performed by: INTERNAL MEDICINE

## 2024-03-21 PROCEDURE — 99999 PR PBB SHADOW E&M-EST. PATIENT-LVL III: CPT | Mod: PBBFAC,,, | Performed by: INTERNAL MEDICINE

## 2024-03-21 RX ORDER — ROSUVASTATIN CALCIUM 10 MG/1
10 TABLET, COATED ORAL DAILY
Qty: 90 TABLET | Refills: 3 | Status: SHIPPED | OUTPATIENT
Start: 2024-03-21 | End: 2024-05-16 | Stop reason: DRUGHIGH
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
## 2024-03-27 ENCOUNTER — OFFICE VISIT (OUTPATIENT)
Dept: ORTHOPEDICS | Facility: CLINIC | Age: 69
End: 2024-03-27
Payer: MEDICARE

## 2024-03-27 ENCOUNTER — HOSPITAL ENCOUNTER (OUTPATIENT)
Dept: RADIOLOGY | Facility: CLINIC | Age: 69
Discharge: HOME | End: 2024-03-27
Attending: ORTHOPAEDIC SURGERY
Payer: MEDICARE

## 2024-03-27 VITALS
WEIGHT: 147.94 LBS | DIASTOLIC BLOOD PRESSURE: 83 MMHG | SYSTOLIC BLOOD PRESSURE: 134 MMHG | BODY MASS INDEX: 25.25 KG/M2 | HEART RATE: 59 BPM | HEIGHT: 64 IN

## 2024-03-27 DIAGNOSIS — S82.871D CLOSED DISPLACED PILON FRACTURE OF RIGHT TIBIA WITH ROUTINE HEALING: ICD-10-CM

## 2024-03-27 DIAGNOSIS — S82.871D CLOSED DISPLACED PILON FRACTURE OF RIGHT TIBIA WITH ROUTINE HEALING: Primary | ICD-10-CM

## 2024-03-27 PROCEDURE — 73610 X-RAY EXAM OF ANKLE: CPT | Mod: RT,,, | Performed by: ORTHOPAEDIC SURGERY

## 2024-03-27 PROCEDURE — 99024 POSTOP FOLLOW-UP VISIT: CPT | Mod: POP,,, | Performed by: ORTHOPAEDIC SURGERY

## 2024-03-27 NOTE — PROGRESS NOTES
"[facility]      Name: Nazia Rodríguez  : 1955  MRN: [id number]  Date: 2024    Chief Complaint   Patient presents with    Follow-up     11 wks f/u, ORIF Rt pilon fx, sx 24, denies pain, WBAT, ambulating without assistance,  has no other complaints at this time        Subjective:       Patient ID: Nazia Rodríguez is a 68 y.o. female.  Chief Complaint   Patient presents with    Follow-up     11 wks f/u, ORIF Rt pilon fx, sx 24, denies pain, WBAT, ambulating without assistance,  has no other complaints at this time         HPI  Pt RTC for f/u of R pilon fx s/p ORIF 24.  Denies pain.  Has been ambulating without assistive devices in normal shoes without problems or pain.  Reports she has been walking more.  Is wanting to begin golfing and playing pickleball again.  No complaints at this time.  She appears well today.  Family member/friend present with her today as well.      ROS:  MSK: No pain  Integ: No signs of abrasions or lacerations  Neuro: No numbness or tingling  Lymphatic: No swelling outside the area of injury     Current Outpatient Medications   Medication Instructions    ALPRAZolam (XANAX) 0.5 MG tablet TAKE ONE TABLET ONCE DAILY AS NEEDED FOR ANXIETY    aspirin (ECOTRIN) 81 mg, Oral, Daily    buPROPion (WELLBUTRIN XL) 150 mg, Oral    DULoxetine (CYMBALTA) 20 mg, Oral, 2 times daily    naltrexone (DEPADE) 50 mg tablet TAKE ONE TABLET BY MOUTH ONCE DAILY AND AS NEEDED FOR craving. maximum OF 2 TABLETS PER DAY.    nystatin (MYCOSTATIN) powder Topical (Top), 4 times daily    oxyCODONE (ROXICODONE) 5 mg, Oral, Every 4 hours PRN    rosuvastatin (CRESTOR) 10 mg, Oral, Daily    triazolam (HALCION) 0.25 mg, Oral, Nightly PRN, FOR INSOMNIA    valACYclovir (VALTREX) 1,000 mg, 2 times daily          Objective:      Visit Vitals  /83 (BP Location: Left arm, Patient Position: Sitting, BP Method: Small (Automatic))   Pulse (!) 59   Ht 5' 4" (1.626 m)   Wt 67.1 " "kg (147 lb 14.9 oz)   BMI 25.39 kg/m²     Physical Exam    General the patient is alert and oriented x3 no acute distress nontoxic-appearing appropriate affect.    Constitutional: Vital signs are examined and stable.  Resp: No signs of labored breathing                      RLE: -Skin: Incisions well healed without signs of infection or dehiscence.  Skin warm and dry.            -MSK: Hip and Knee F/E, EHL/FHL, Gastroc/Tib anterior Strength 5/5.  Ambulates without gait disturbance or assistive devices.            -Neuro:  Sensation intact to light touch L3-S1 dermatomes           -Lymphatic: No signs of lymphadenopathy           -CV: PT pulse 2+.       Body mass index is 25.39 kg/m².  Ideal body weight: 54.7 kg (120 lb 9.5 oz)  Adjusted ideal body weight: 59.7 kg (131 lb 8.4 oz)  Hgb   Date Value Ref Range Status   01/04/2024 10.6 (L) 12.0 - 16.0 g/dL Final   01/03/2024 10.8 (L) 12.0 - 16.0 g/dL Final     Hct   Date Value Ref Range Status   01/04/2024 31.2 (L) 37.0 - 47.0 % Final   01/03/2024 32.1 (L) 37.0 - 47.0 % Final     No results found for: "[id number]"  WBC   Date Value Ref Range Status   01/04/2024 4.97 4.50 - 11.50 x10(3)/mcL Final   01/03/2024 6.84 4.50 - 11.50 x10(3)/mcL Final       Radiology:   3 view right ankle demonstrates hardware to distal tibia and fibula; hardware in place without failure; fracture of tibia seen with evidence of bone healing.         Assessment:           ICD-10-CM ICD-9-CM   1. Closed displaced pilon fracture of right tibia with routine healing  S82.871D V54.19           Plan:         Follow up in about 2 months (around 5/27/2024).    1. Closed displaced pilon fracture of right tibia with routine healing  -     X-Ray Ankle Complete Right; Future; Expected date: 03/27/2024      Pt is progressing well.  She is out of the cam boot and ambulating without problems.  I am happy with her progress.  She may cont ambulating and wearing normal footwear.  F/u in 2 months for repeat XR.  " Will move to PRN appts after next visit if she continues to do well.     Pt verbalizes understanding and agrees with tx plan. Pt to call clinic with any questions/concerns.      The above findings, diagnostics, and treatment plan were discussed with Dr. Sanchez who is in agreement with the plan of care except as stated in additional documentation.     NIRAV [facility]Franc[facility]        Future Appointments   Date Time Provider Department Center   4/9/2024  1:20 PM Casimiro Kat II, MD [facility] 461MDAC [facility] [place]   5/20/2024  8:00 AM [facility] NM1 400 LB [facility]IT [facility]   5/20/2024  9:00 AM CV [facility] EXERCISE STRESS 01 [facility] CARDIA [facility]   5/20/2024 10:00 AM [facility] NM1 400 LB LIMIT [facility] NU[facility]   5/28/2024  9:30 AM Zoran Sanchez MD [facility]   6/13/2024  8:20 AM Johnny Hansen MD [facility] CRDVSRG H&V [facility]   7/22/2024  9:20 AM [facility]

## 2024-04-15 DIAGNOSIS — B00.1 FEVER BLISTER: Primary | ICD-10-CM

## 2024-04-15 RX ORDER — VALACYCLOVIR HYDROCHLORIDE 1 G/1
1000 TABLET, FILM COATED ORAL 2 TIMES DAILY
Qty: 14 TABLET | Refills: 1 | Status: SHIPPED | OUTPATIENT
Start: 2024-04-15 | End: 2024-06-13

## 2024-05-02 ENCOUNTER — TELEPHONE (OUTPATIENT)
Dept: INTERNAL MEDICINE | Facility: CLINIC | Age: 69
End: 2024-05-02
Payer: COMMERCIAL

## 2024-05-02 DIAGNOSIS — G47.00 INSOMNIA, UNSPECIFIED TYPE: Primary | ICD-10-CM

## 2024-05-02 RX ORDER — TRAZODONE HYDROCHLORIDE 50 MG/1
50 TABLET ORAL NIGHTLY PRN
Qty: 30 TABLET | Refills: 0 | Status: SHIPPED | OUTPATIENT
Start: 2024-05-02 | End: 2024-06-13

## 2024-05-02 NOTE — TELEPHONE ENCOUNTER
Pt states she is still on Halcion but hasn't taken Xanax in a while. States she was prescribed Trazodone in the past and would like to try again. States she was taking Benadryl but it wasn't helping. Please advise

## 2024-05-02 NOTE — TELEPHONE ENCOUNTER
----- Message from Tamara Lozada sent at 5/2/2024  8:42 AM CDT -----  Regarding: advice  Type:  Needs Medical Advice    Who Called: pt  Symptoms (please be specific): having difficulty sleeping     Pharmacy name and phone #:  [facility]  Would the patient rather a call back or a response via [facility]? C/b  Best Call Back Number: [phone]    Additional Information: pt wants pcp to order trazodone or another sleep medication

## 2024-05-02 NOTE — TELEPHONE ENCOUNTER
Pt notified of medication prescribed and recommendations, voiced understanding. She wants to let MD know that she weaned herself off of Cymbalta a couple months ago and is doing well without it.

## 2024-05-16 ENCOUNTER — HOSPITAL ENCOUNTER (OUTPATIENT)
Dept: RADIOLOGY | Facility: HOSPITAL | Age: 69
Discharge: HOME | End: 2024-05-16
Attending: THORACIC SURGERY (CARDIOTHORACIC VASCULAR SURGERY)
Payer: MEDICARE

## 2024-05-16 DIAGNOSIS — I71.012 DESCENDING THORACIC AORTIC DISSECTION: ICD-10-CM

## 2024-05-16 PROCEDURE — 71275 CT ANGIOGRAPHY CHEST: CPT | Mod: TC

## 2024-05-16 PROCEDURE — 25500020 PHARM REV CODE 255: Performed by: THORACIC SURGERY (CARDIOTHORACIC VASCULAR SURGERY)

## 2024-05-16 RX ORDER — ROSUVASTATIN CALCIUM 5 MG/1
5 TABLET, COATED ORAL DAILY
COMMUNITY
Start: 2024-03-12 | End: 2024-05-16 | Stop reason: SDUPTHER

## 2024-05-16 RX ADMIN — IOHEXOL 100 ML: 350 INJECTION, SOLUTION INTRAVENOUS at 03:05

## 2024-05-17 RX ORDER — ROSUVASTATIN CALCIUM 5 MG/1
5 TABLET, COATED ORAL DAILY
Qty: 90 TABLET | Refills: 3 | Status: SHIPPED | OUTPATIENT
Start: 2024-05-17 | End: 2025-05-17

## 2024-06-05 DIAGNOSIS — F32.0 CURRENT MILD EPISODE OF MAJOR DEPRESSIVE DISORDER WITHOUT PRIOR EPISODE: ICD-10-CM

## 2024-06-05 RX ORDER — TRIAZOLAM 0.25 MG/1
TABLET ORAL
Qty: 90 TABLET | Refills: 0 | Status: SHIPPED | OUTPATIENT
Start: 2024-06-05 | End: 2024-06-13

## 2024-06-13 ENCOUNTER — OFFICE VISIT (OUTPATIENT)
Dept: CARDIAC SURGERY | Facility: CLINIC | Age: 69
End: 2024-06-13
Payer: MEDICARE

## 2024-06-13 ENCOUNTER — HOSPITAL ENCOUNTER (OUTPATIENT)
Dept: RADIOLOGY | Facility: CLINIC | Age: 69
Discharge: HOME | End: 2024-06-13
Attending: ORTHOPAEDIC SURGERY
Payer: MEDICARE

## 2024-06-13 ENCOUNTER — OFFICE VISIT (OUTPATIENT)
Dept: ORTHOPEDICS | Facility: CLINIC | Age: 69
End: 2024-06-13
Payer: MEDICARE

## 2024-06-13 VITALS
DIASTOLIC BLOOD PRESSURE: 68 MMHG | HEIGHT: 64 IN | SYSTOLIC BLOOD PRESSURE: 138 MMHG | HEART RATE: 54 BPM | BODY MASS INDEX: 25.25 KG/M2 | WEIGHT: 147.94 LBS

## 2024-06-13 VITALS — HEIGHT: 64 IN | BODY MASS INDEX: 25.39 KG/M2

## 2024-06-13 DIAGNOSIS — I71.012 DESCENDING THORACIC AORTIC DISSECTION: Primary | ICD-10-CM

## 2024-06-13 DIAGNOSIS — S82.871D CLOSED DISPLACED PILON FRACTURE OF RIGHT TIBIA WITH ROUTINE HEALING: ICD-10-CM

## 2024-06-13 DIAGNOSIS — S82.871D CLOSED DISPLACED PILON FRACTURE OF RIGHT TIBIA WITH ROUTINE HEALING: Primary | ICD-10-CM

## 2024-06-13 PROCEDURE — 99213 OFFICE O/P EST LOW 20 MIN: CPT | Mod: ,,,

## 2024-06-13 PROCEDURE — 73610 X-RAY EXAM OF ANKLE: CPT | Mod: RT,,, | Performed by: ORTHOPAEDIC SURGERY

## 2024-06-13 PROCEDURE — 99214 OFFICE O/P EST MOD 30 MIN: CPT | Mod: ,,, | Performed by: THORACIC SURGERY (CARDIOTHORACIC VASCULAR SURGERY)

## 2024-06-13 NOTE — PROGRESS NOTES
"[facility]      Name: Nazia Rodríguez  : 1955  MRN: [id number]  Date: 2024    Chief Complaint   Patient presents with    Follow-up     5.5 mos,  ORIF Rt pilon fx, sx 24, denies pain, has concerns with foot turning outwards, wbat, ambulating without assistance, has no other complaints at this time,        Subjective:      Chief Complaint   Patient presents with    Follow-up     5.5 mos,  ORIF Rt pilon fx, sx 24, denies pain, has concerns with foot turning outwards, wbat, ambulating without assistance, has no other complaints at this time,         HPI  Nazia Rodríguez is a 68 y.o. female who presents to clinic for 5.5 month follow-up status post open reduction internal fixation of right pilon fracture.  Reports he has had no flare ups and is walking long distances without pain or assistive devices.  Presents in [facility] today.  Has a trip coming up where she will be hiking on elevated surfaces for approximately 8-10 miles.  She does report that her foot turns out slightly when walking long distances and she has to correct this but is conscious of it.  Denies pain.  No other complaints at this time.      ROS:  Constitutional: Denies fever chills  MSK: Pain evident at site of injury located in HPI,   Integ: No signs of abrasions or lacerations  Neuro: No numbness or tingling  Lymphatic: No swelling outside the area of injury     Current Outpatient Medications   Medication Instructions    aspirin (ECOTRIN) 81 mg, Oral, Daily    rosuvastatin (CRESTOR) 5 mg, Oral, Daily        Objective:     Visit Vitals  /68   Pulse (!) 54   Ht 5' 4" (1.626 m)   Wt 67.1 kg (147 lb 14.9 oz)   BMI 25.39 kg/m²     Physical Exam    General the patient is alert and oriented x3 no acute distress nontoxic-appearing appropriate affect.    Constitutional: Vital signs are examined and stable.  Resp: No signs of labored breathing    Right lower extremity:  Incisions well healed.  Skin warm " "and dry.  No edema noted.  Gross motor intact.  Tolerates passive range of motion of ankle without pain.  Weightbear as and ambulates without assistive devices.  Light sensation intact.  Distal pulses intact.  No painful or prominent hardware.        Body mass index is 25.39 kg/m².  Ideal body weight: 54.7 kg (120 lb 9.5 oz)  Adjusted ideal body weight: 59.7 kg (131 lb 8.4 oz)  Hgb   Date Value Ref Range Status   01/04/2024 10.6 (L) 12.0 - 16.0 g/dL Final   01/03/2024 10.8 (L) 12.0 - 16.0 g/dL Final     Hct   Date Value Ref Range Status   01/04/2024 31.2 (L) 37.0 - 47.0 % Final   01/03/2024 32.1 (L) 37.0 - 47.0 % Final     No results found for: "[id number]"  WBC   Date Value Ref Range Status   01/04/2024 4.97 4.50 - 11.50 x10(3)/mcL Final   01/03/2024 6.84 4.50 - 11.50 x10(3)/mcL Final       Radiology:   Right ankle x-ray:  Hardware intact without failure.  Fracture is consolidated.  Joint space preserved.    Assessment:       ICD-10-CM ICD-9-CM   1. Closed displaced pilon fracture of right tibia with routine healing  S82.871D V54.19       Plan:     1. Closed displaced pilon fracture of right tibia with routine healing  -     X-Ray Ankle Complete Right; Future; Expected date: 06/13/2024    Patient doing well.  Reports she does not have many flare-ups.  Is walking long distances again without pain or complications.  X-ray shows fracture consolidation.  Patient is doing well and I am happy with her progress.  The only complaint she does have is her foot turns out slightly when walking which she has to correct.  We discussed different exercises to build strength and to continue correcting her gait.  Physical therapy order for gait training was offered and declined.  Patient may follow up on as-needed basis.  Pt verbalizes understanding and agrees with tx plan. Pt to call clinic with any questions/concerns.      The above findings, diagnostics, and treatment plan were discussed with Dr. Sanchez who is in agreement with " the plan of care except as stated in additional documentation.     Ashley Gunther, PA-C [facility]    Future Appointments   Date Time Provider Department [facility]   7/22/2024  9:20 AM [facility]   6/19/2025  8:10 AM Johnny Hansen MD [facility] [place]       This note was created with the assistance of voice recognition software or phone dictation. There may be transcription errors as a result of using this technology however minimal. Effort has been made to assure accuracy of transcription but any obvious errors or omissions should be clarified with the author of the document.

## 2024-09-23 DIAGNOSIS — G47.00 INSOMNIA, UNSPECIFIED TYPE: ICD-10-CM

## 2024-09-23 RX ORDER — TRAZODONE HYDROCHLORIDE 50 MG/1
50 TABLET ORAL NIGHTLY PRN
Qty: 30 TABLET | Refills: 0 | Status: SHIPPED | OUTPATIENT
Start: 2024-09-23

## 2024-09-23 NOTE — TELEPHONE ENCOUNTER
Spoke to pt and she is requesting a refill of Trazodone but I see it was discontinued. [facility] is using this in place of Halcion. Please advise

## 2024-10-15 ENCOUNTER — TELEPHONE (OUTPATIENT)
Dept: INTERNAL MEDICINE | Facility: CLINIC | Age: 69
End: 2024-10-15
Payer: COMMERCIAL

## 2024-10-15 NOTE — TELEPHONE ENCOUNTER
----- Message from Maria T sent at 10/15/2024  2:43 PM CDT -----  Who Called: Nazia Rodríguez    Caller is requesting assistance/information from provider's office.  Preferred Method of Contact: Phone Call  Patient's Preferred Phone Number on File: [phone]   Best Call Back Number, if different:##[phone]#  Additional Information: medical advice,pt called to confirm/discuss which vaccine should she take before going out of the country in November, please advise, thank

## 2024-10-16 NOTE — TELEPHONE ENCOUNTER
Per MD she should have per CDC guidelines:  -Flu (I do not see for this year)  -MMR (I do not see on her record)  -Shingles (she has completed both rounds)  -Varicella (I do not see on her record)  -Hep A ( I do not see on her record)  -Hep B (I do not see on her record)  -yellow fever(I do not see on her record)  -Typhoid (I do not see on her record)  -chikungunya - an illness spread by mosquitoes (I do not see on her record)    We can only administer flu vaccine at our clinic. Others would have to be done at the health center or pharmacy

## 2024-10-16 NOTE — TELEPHONE ENCOUNTER
Spoke with pt and she asked that I call her landline and leave a message with the recommended vaccines. Left vm with the names of recommended vaccines.

## 2024-11-01 ENCOUNTER — PATIENT MESSAGE (OUTPATIENT)
Dept: CARDIOLOGY | Facility: CLINIC | Age: 69
End: 2024-11-01
Payer: COMMERCIAL

## 2024-11-04 DIAGNOSIS — G47.00 INSOMNIA, UNSPECIFIED TYPE: ICD-10-CM

## 2024-11-04 RX ORDER — TRAZODONE HYDROCHLORIDE 50 MG/1
50 TABLET ORAL NIGHTLY PRN
Qty: 30 TABLET | Refills: 0 | Status: SHIPPED | OUTPATIENT
Start: 2024-11-04

## 2024-11-05 ENCOUNTER — TELEPHONE (OUTPATIENT)
Dept: INTERNAL MEDICINE | Facility: CLINIC | Age: 69
End: 2024-11-05
Payer: COMMERCIAL

## 2024-11-05 NOTE — TELEPHONE ENCOUNTER
Spoke with p and advised her that her Trazodone was refilled yesterday and that she needs an appt to continue getting refills. Transferred to ext [phone] to schedule an appt.

## 2024-11-05 NOTE — TELEPHONE ENCOUNTER
----- Message from Roshni sent at 11/5/2024 12:07 PM CST -----  Who Called: Naziagarrick Rodríguez    Patient is returning phone call    Who Left Message for Patient:  Does the patient know what this is regarding?:meds      Preferred Method of Contact: Phone Call  Patient's Preferred Phone Number on File: [phone]   Best Call Back Number, if different:  Additional Information: pt called wants to speak with nurse regarding no refills onTRIAZADONE also stating she stopped taking HALCION herself and alterating Benadryl& Triazadone and need a refill pls advise

## 2024-11-06 ENCOUNTER — TELEPHONE (OUTPATIENT)
Dept: INTERNAL MEDICINE | Facility: CLINIC | Age: 69
End: 2024-11-06
Payer: COMMERCIAL

## 2024-11-06 DIAGNOSIS — I25.10 ATHEROSCLEROSIS OF NATIVE CORONARY ARTERY OF NATIVE HEART WITHOUT ANGINA PECTORIS: ICD-10-CM

## 2024-11-06 DIAGNOSIS — I73.9 PVD (PERIPHERAL VASCULAR DISEASE): ICD-10-CM

## 2024-11-06 DIAGNOSIS — E78.49 OTHER HYPERLIPIDEMIA: ICD-10-CM

## 2024-11-06 DIAGNOSIS — Z00.00 WELLNESS EXAMINATION: Primary | ICD-10-CM

## 2024-11-06 DIAGNOSIS — R53.83 FATIGUE, UNSPECIFIED TYPE: ICD-10-CM

## 2024-11-06 NOTE — TELEPHONE ENCOUNTER
----- Message from Nurse Calhoun sent at 11/6/2024  7:47 AM CST -----  Regarding: ana maría fernando 11/14 @1:20  Are there any outstanding tasks in patient chart? Needs fasting labs    Is there documentation of outstanding tasks in patient chart? no    Has patient been to the ER, urgent care, or another physician since last visit?    Has patient done any blood work or x-rays since last visit?    5. PLEASE HAVE PATIENT BRING MEDICATION LIST OR BOTTLES TO EVERY OFFICE VISIT

## 2024-12-11 DIAGNOSIS — F32.0 CURRENT MILD EPISODE OF MAJOR DEPRESSIVE DISORDER WITHOUT PRIOR EPISODE: ICD-10-CM

## 2024-12-11 RX ORDER — NYSTATIN 100000 [USP'U]/G
POWDER TOPICAL 4 TIMES DAILY
Qty: 60 G | Refills: 1 | Status: SHIPPED | OUTPATIENT
Start: 2024-12-11

## 2025-01-13 ENCOUNTER — HOSPITAL ENCOUNTER (OUTPATIENT)
Dept: RADIOLOGY | Facility: CLINIC | Age: 70
Discharge: HOME | End: 2025-01-13
Attending: ORTHOPAEDIC SURGERY
Payer: MEDICARE

## 2025-01-13 ENCOUNTER — OFFICE VISIT (OUTPATIENT)
Dept: ORTHOPEDICS | Facility: CLINIC | Age: 70
End: 2025-01-13
Payer: MEDICARE

## 2025-01-13 VITALS
BODY MASS INDEX: 25.21 KG/M2 | SYSTOLIC BLOOD PRESSURE: 144 MMHG | HEART RATE: 54 BPM | DIASTOLIC BLOOD PRESSURE: 78 MMHG | WEIGHT: 147.69 LBS | HEIGHT: 64 IN

## 2025-01-13 DIAGNOSIS — S82.871D CLOSED DISPLACED PILON FRACTURE OF RIGHT TIBIA WITH ROUTINE HEALING: ICD-10-CM

## 2025-01-13 DIAGNOSIS — S82.871D CLOSED DISPLACED PILON FRACTURE OF RIGHT TIBIA WITH ROUTINE HEALING: Primary | ICD-10-CM

## 2025-01-13 PROCEDURE — 73590 X-RAY EXAM OF LOWER LEG: CPT | Mod: RT,,, | Performed by: ORTHOPAEDIC SURGERY

## 2025-01-13 PROCEDURE — 99213 OFFICE O/P EST LOW 20 MIN: CPT | Mod: ,,, | Performed by: ORTHOPAEDIC SURGERY

## 2025-01-13 NOTE — PROGRESS NOTES
Subjective:       Patient ID: Nazia Rodríguez is a 69 y.o. female.    Chief Complaint   Patient presents with    Right Ankle - Follow-up     1 year f/u from ORIF right pilon fx. Ambulating in heels today. Reports discomfort from hardware but is now improved.         Patient is here today for an evaluation of right ankle.  She has some pain along the medial border of her shin with some prominence of what she thought was a screw that potentially had backed out.  She states that it got better with a little rest and elevation. It is not currently bothering her today but she wanted to have it checked out.  She is ambulating well in regular shoes.    Follow-up  Pertinent negatives include no abdominal pain, chest pain, chills, congestion, coughing, fever, nausea, neck pain, numbness or vomiting.       Review of Systems   Constitutional: Negative for chills, fever and malaise/fatigue.   HENT:  Negative for congestion and hearing loss.    Eyes:  Negative for visual disturbance.   Cardiovascular:  Negative for chest pain and syncope.   Respiratory:  Negative for cough and shortness of breath.    Hematologic/Lymphatic: Does not bruise/bleed easily.   Skin:  Negative for color change and suspicious lesions.   Musculoskeletal:  Negative for falls and neck pain.   Gastrointestinal:  Negative for abdominal pain, nausea and vomiting.   Genitourinary:  Negative for dysuria and hematuria.   Neurological:  Negative for numbness and sensory change.   Psychiatric/Behavioral:  Negative for altered mental status. The patient is not nervous/anxious.         Current Outpatient Medications on File Prior to Visit   Medication Sig Dispense Refill    aspirin (ECOTRIN) 81 MG EC tablet Take 81 mg by mouth once daily.      nystatin (MYCOSTATIN) powder APPLY TOPICALLY 4 TIMES DAILY 60 g 1    rosuvastatin (CRESTOR) 5 MG tablet Take 1 tablet (5 mg total) by mouth once daily. 90 tablet 3    traZODone (DESYREL) 50 MG tablet TAKE ONE TABLET BY  "MOUTH NIGHTLY AS NEEDED FOR insomnia 30 tablet 0     No current facility-administered medications on file prior to visit.          Objective:      BP (!) 144/78   Pulse (!) 54   Ht 5' 4" (1.626 m)   Wt 67 kg (147 lb 11.3 oz)   BMI 25.35 kg/m²   Physical Exam  Constitutional:       General: She is not in acute distress.     Appearance: Normal appearance. She is not ill-appearing.   HENT:      Head: Normocephalic and atraumatic.      Nose: No congestion.   Eyes:      Extraocular Movements: Extraocular movements intact.   Cardiovascular:      Rate and Rhythm: Normal rate and regular rhythm.      Pulses: Normal pulses.   Pulmonary:      Effort: Pulmonary effort is normal.      Breath sounds: Normal breath sounds.   Abdominal:      General: There is no distension.      Palpations: Abdomen is soft.      Tenderness: There is no abdominal tenderness.   Musculoskeletal:      Comments: Right lower extremity:  All surgical incisions are well healed.  Her hardware is palpable over the medial aspect of the ankle though not painful or prominent.  Proximally over her percutaneous incision she does have some scar tissue between the skin and the plate that is tender to deep palpation however she has no prominence of her hardware.  She has no calf swelling or tenderness, no signs of DVT.  She has good range motion of the ankle and digits distally.   Skin:     General: Skin is warm and dry.   Neurological:      Mental Status: She is alert and oriented to person, place, and time. Mental status is at baseline.   Psychiatric:         Mood and Affect: Mood normal.         Behavior: Behavior normal.         Thought Content: Thought content normal.         Judgment: Judgment normal.        Body mass index is 25.35 kg/m².    Radiology:   Right tibia two views: Hardware intact.  Alignment maintained.  Fracture is completely consolidated.  No evidence of any hardware fracture or loosening.      Assessment:         1. Closed displaced pilon " fracture of right tibia with routine healing  CANCELED: X-Ray Ankle Complete Right              Plan:       She is doing well.  There is no evidence of any hardware loosening.  Her fracture is completely consolidated.  No plans for any intervention at this time.  She can continue full activities without restrictions.  She can continue weight-bearing exercises in order to improve her bone quality and strength.  She is happy with this plan of care and feels reassured today.  She is released to as-needed follow-up.    This note/OR report was created with the assistance of  voice recognition software or phone  dictation.  There may be transcription errors as a result of using this technology however minimal. Effort has been made to assure accuracy of transcription but any obvious errors or omissions should be clarified with the author of the document.       Zoran Sanchez MD  Orthopedic Trauma  [facility]      No follow-ups on file.    Closed displaced pilon fracture of right tibia with routine healing  -     Cancel: X-Ray Ankle Complete Right; Future; Expected date: 01/13/2025              No orders of the defined types were placed in this encounter.      Future Appointments   Date Time Provider Department Center   6/19/2025  8:10 AM Johnny Hansen MD [facility] [place]

## 2025-03-24 ENCOUNTER — TELEPHONE (OUTPATIENT)
Dept: INTERNAL MEDICINE | Facility: CLINIC | Age: 70
End: 2025-03-24
Payer: MEDICARE

## 2025-03-24 DIAGNOSIS — J43.9 PULMONARY EMPHYSEMA, UNSPECIFIED EMPHYSEMA TYPE: ICD-10-CM

## 2025-03-24 DIAGNOSIS — R79.9 ABNORMAL FINDING OF BLOOD CHEMISTRY, UNSPECIFIED: ICD-10-CM

## 2025-03-24 DIAGNOSIS — R53.83 FATIGUE, UNSPECIFIED TYPE: ICD-10-CM

## 2025-03-24 DIAGNOSIS — I25.10 ATHEROSCLEROSIS OF NATIVE CORONARY ARTERY OF NATIVE HEART WITHOUT ANGINA PECTORIS: ICD-10-CM

## 2025-03-24 DIAGNOSIS — F10.29 ALCOHOL DEPENDENCE WITH UNSPECIFIED ALCOHOL-INDUCED DISORDER: ICD-10-CM

## 2025-03-24 DIAGNOSIS — I73.9 PVD (PERIPHERAL VASCULAR DISEASE): ICD-10-CM

## 2025-03-24 DIAGNOSIS — Z00.00 WELLNESS EXAMINATION: Primary | ICD-10-CM

## 2025-03-24 DIAGNOSIS — E78.49 OTHER HYPERLIPIDEMIA: ICD-10-CM

## 2025-03-24 NOTE — TELEPHONE ENCOUNTER
----- Message from Roshni sent at 3/24/2025 10:27 AM CDT -----  Who Called: Nazia Merrill is returning phone callWho Left Message for Patient:Does the patient know what this is regarding?:Preferred Method of Contact: Phone CallPatient's Preferred Phone Number on File: [phone] Best Call Back Number, if different:Additional Information: pt called to speak with nurse regarding blood work hospitals advise [phone]

## 2025-05-02 ENCOUNTER — LAB VISIT (OUTPATIENT)
Dept: LAB | Facility: HOSPITAL | Age: 70
End: 2025-05-02
Attending: INTERNAL MEDICINE
Payer: MEDICARE

## 2025-05-02 DIAGNOSIS — R79.9 ABNORMAL FINDING OF BLOOD CHEMISTRY, UNSPECIFIED: ICD-10-CM

## 2025-05-02 DIAGNOSIS — Z00.00 WELLNESS EXAMINATION: ICD-10-CM

## 2025-05-02 DIAGNOSIS — F10.29 ALCOHOL DEPENDENCE WITH UNSPECIFIED ALCOHOL-INDUCED DISORDER: ICD-10-CM

## 2025-05-02 DIAGNOSIS — E78.49 OTHER HYPERLIPIDEMIA: ICD-10-CM

## 2025-05-02 DIAGNOSIS — I25.10 ATHEROSCLEROSIS OF NATIVE CORONARY ARTERY OF NATIVE HEART WITHOUT ANGINA PECTORIS: ICD-10-CM

## 2025-05-02 DIAGNOSIS — R53.83 FATIGUE, UNSPECIFIED TYPE: ICD-10-CM

## 2025-05-02 DIAGNOSIS — I73.9 PVD (PERIPHERAL VASCULAR DISEASE): ICD-10-CM

## 2025-05-02 DIAGNOSIS — J43.9 PULMONARY EMPHYSEMA, UNSPECIFIED EMPHYSEMA TYPE: ICD-10-CM

## 2025-05-02 LAB
ALBUMIN SERPL-MCNC: 4 G/DL (ref 3.4–4.8)
ALBUMIN/GLOB SERPL: 1.4 RATIO (ref 1.1–2)
ALP SERPL-CCNC: 73 UNIT/L (ref 40–150)
ALT SERPL-CCNC: 16 UNIT/L (ref 0–55)
ANION GAP SERPL CALC-SCNC: 9 MEQ/L
AST SERPL-CCNC: 18 UNIT/L (ref 11–45)
BASOPHILS # BLD AUTO: 0.04 X10(3)/MCL
BASOPHILS NFR BLD AUTO: 0.9 %
BILIRUB SERPL-MCNC: 0.4 MG/DL
BUN SERPL-MCNC: 21.3 MG/DL (ref 9.8–20.1)
CALCIUM SERPL-MCNC: 9.2 MG/DL (ref 8.4–10.2)
CHLORIDE SERPL-SCNC: 109 MMOL/L (ref 98–107)
CHOLEST SERPL-MCNC: 233 MG/DL
CHOLEST/HDLC SERPL: 4 {RATIO} (ref 0–5)
CO2 SERPL-SCNC: 23 MMOL/L (ref 23–31)
CREAT SERPL-MCNC: 0.66 MG/DL (ref 0.55–1.02)
CREAT/UREA NIT SERPL: 32
EOSINOPHIL # BLD AUTO: 0.12 X10(3)/MCL (ref 0–0.9)
EOSINOPHIL NFR BLD AUTO: 2.8 %
ERYTHROCYTE [DISTWIDTH] IN BLOOD BY AUTOMATED COUNT: 13.8 % (ref 11.5–17)
EST. AVERAGE GLUCOSE BLD GHB EST-MCNC: 116.9 MG/DL
GFR SERPLBLD CREATININE-BSD FMLA CKD-EPI: >60 ML/MIN/1.73/M2
GLOBULIN SER-MCNC: 2.9 GM/DL (ref 2.4–3.5)
GLUCOSE SERPL-MCNC: 102 MG/DL (ref 82–115)
HBA1C MFR BLD: 5.7 %
HCT VFR BLD AUTO: 42.7 % (ref 37–47)
HDLC SERPL-MCNC: 63 MG/DL (ref 35–60)
HGB BLD-MCNC: 14.1 G/DL (ref 12–16)
IMM GRANULOCYTES # BLD AUTO: 0.01 X10(3)/MCL (ref 0–0.04)
IMM GRANULOCYTES NFR BLD AUTO: 0.2 %
LDLC SERPL CALC-MCNC: 151 MG/DL (ref 50–140)
LYMPHOCYTES # BLD AUTO: 0.97 X10(3)/MCL (ref 0.6–4.6)
LYMPHOCYTES NFR BLD AUTO: 22.2 %
MCH RBC QN AUTO: 31.1 PG (ref 27–31)
MCHC RBC AUTO-ENTMCNC: 33 G/DL (ref 33–36)
MCV RBC AUTO: 94.3 FL (ref 80–94)
MONOCYTES # BLD AUTO: 0.63 X10(3)/MCL (ref 0.1–1.3)
MONOCYTES NFR BLD AUTO: 14.4 %
NEUTROPHILS # BLD AUTO: 2.59 X10(3)/MCL (ref 2.1–9.2)
NEUTROPHILS NFR BLD AUTO: 59.5 %
NRBC BLD AUTO-RTO: 0 %
PLATELET # BLD AUTO: 297 X10(3)/MCL (ref 130–400)
PMV BLD AUTO: 8.8 FL (ref 7.4–10.4)
POTASSIUM SERPL-SCNC: 4.5 MMOL/L (ref 3.5–5.1)
PROT SERPL-MCNC: 6.9 GM/DL (ref 5.8–7.6)
RBC # BLD AUTO: 4.53 X10(6)/MCL (ref 4.2–5.4)
SODIUM SERPL-SCNC: 141 MMOL/L (ref 136–145)
TRIGL SERPL-MCNC: 94 MG/DL (ref 37–140)
TSH SERPL-ACNC: 2.17 UIU/ML (ref 0.35–4.94)
VLDLC SERPL CALC-MCNC: 19 MG/DL
WBC # BLD AUTO: 4.36 X10(3)/MCL (ref 4.5–11.5)

## 2025-05-02 PROCEDURE — 84443 ASSAY THYROID STIM HORMONE: CPT

## 2025-05-02 PROCEDURE — 80053 COMPREHEN METABOLIC PANEL: CPT

## 2025-05-02 PROCEDURE — 85025 COMPLETE CBC W/AUTO DIFF WBC: CPT

## 2025-05-02 PROCEDURE — 36415 COLL VENOUS BLD VENIPUNCTURE: CPT

## 2025-05-02 PROCEDURE — 83036 HEMOGLOBIN GLYCOSYLATED A1C: CPT

## 2025-05-02 PROCEDURE — 80061 LIPID PANEL: CPT

## 2025-05-14 ENCOUNTER — TELEPHONE (OUTPATIENT)
Dept: INTERNAL MEDICINE | Facility: CLINIC | Age: 70
End: 2025-05-14
Payer: MEDICARE

## 2025-05-14 NOTE — TELEPHONE ENCOUNTER
----- Message from Nurse Calhoun sent at 5/14/2025  7:46 AM CDT -----  Regarding: ana maría fernando 5/22 @2:20  Are there any outstanding tasks in chart? NoIs there any documentation of tasks? NoHas the pt seen another physician, been to ER, U[facility], or admitted to hospital since last visit?Has the pt done blood work or imaging since last visit? 5. PLEASE HAVE PATIENT BRING MEDICATION LIST OR BOTTLES TO EVERY OFFICE VISIT

## 2025-05-21 ENCOUNTER — TELEPHONE (OUTPATIENT)
Dept: CARDIOLOGY | Facility: CLINIC | Age: 70
End: 2025-05-21
Payer: MEDICARE

## 2025-05-21 NOTE — TELEPHONE ENCOUNTER
----- Message from Codie sent at 5/21/2025 10:25 AM CDT -----  Regarding: appt  Pls call pt at [phone].  She needs to get in to see Dr. Maldonado before she leaves [place] next [place].She is also wanting to give her Cholesterol readings:Total Cholesterol- 233LDL-151HDL- 63Ratio- 4Thank you

## 2025-05-22 ENCOUNTER — OFFICE VISIT (OUTPATIENT)
Dept: INTERNAL MEDICINE | Facility: CLINIC | Age: 70
End: 2025-05-22
Payer: MEDICARE

## 2025-05-22 VITALS
DIASTOLIC BLOOD PRESSURE: 72 MMHG | BODY MASS INDEX: 25.1 KG/M2 | SYSTOLIC BLOOD PRESSURE: 121 MMHG | HEART RATE: 69 BPM | WEIGHT: 147 LBS | OXYGEN SATURATION: 97 % | HEIGHT: 64 IN

## 2025-05-22 DIAGNOSIS — Z12.31 BREAST CANCER SCREENING BY MAMMOGRAM: Primary | ICD-10-CM

## 2025-05-22 DIAGNOSIS — E78.5 HYPERLIPIDEMIA, UNSPECIFIED HYPERLIPIDEMIA TYPE: ICD-10-CM

## 2025-05-22 DIAGNOSIS — I71.012 DESCENDING THORACIC AORTIC DISSECTION: ICD-10-CM

## 2025-05-22 DIAGNOSIS — Z00.00 ANNUAL PHYSICAL EXAM: ICD-10-CM

## 2025-05-22 DIAGNOSIS — F32.0 CURRENT MILD EPISODE OF MAJOR DEPRESSIVE DISORDER WITHOUT PRIOR EPISODE: ICD-10-CM

## 2025-05-22 PROBLEM — J43.9 PULMONARY EMPHYSEMA, UNSPECIFIED EMPHYSEMA TYPE: Status: RESOLVED | Noted: 2023-04-11 | Resolved: 2025-05-22

## 2025-05-22 RX ORDER — VALACYCLOVIR HYDROCHLORIDE 1 G/1
1000 TABLET, FILM COATED ORAL 2 TIMES DAILY
Qty: 30 TABLET | Refills: 5 | Status: SHIPPED | OUTPATIENT
Start: 2025-05-22

## 2025-05-22 RX ORDER — NYSTATIN 100000 [USP'U]/G
POWDER TOPICAL 4 TIMES DAILY
Qty: 60 G | Refills: 1 | Status: SHIPPED | OUTPATIENT
Start: 2025-05-22

## 2025-05-22 RX ORDER — SULFAMETHOXAZOLE AND TRIMETHOPRIM 800; 160 MG/1; MG/1
1 TABLET ORAL 2 TIMES DAILY
Qty: 14 TABLET | Refills: 0 | Status: SHIPPED | OUTPATIENT
Start: 2025-05-22 | End: 2025-05-29

## 2025-05-22 RX ORDER — TRIAZOLAM 0.25 MG/1
0.25 TABLET ORAL NIGHTLY PRN
Qty: 15 TABLET | Refills: 0 | Status: SHIPPED | OUTPATIENT
Start: 2025-05-22

## 2025-05-22 NOTE — PROGRESS NOTES
Patient ID: Nazia Rodríguez is a 69 y.o. female.    Chief Complaint: Medicare AWV (Labs done )      HPI:   Patient presents here today for above reason.     Nazia is a 69-year-old female presenting today for annual visit have blood work done here for review cholesterol still little elevated she is on 5 mg of statin.  She does follow cardiology she has a history of aortic dissection with the graft doing well in that regard.  Rest of her age-appropriate screening up-to-date here for follow-up.  She will be traveling to [place] and would like prophylactic antibiotics.  Also would like her nystatin refill.  Otherwise doing great here for follow-up.    The patient's Health Maintenance was reviewed and the following appears to be due at this time:   Health Maintenance Due   Topic Date Due    Hepatitis C Screening  Never done    Pneumococcal Vaccines (Age 50+) (1 of 2 - PCV) Never done    RSV Vaccine (Age 60+ and Pregnant patients) (1 - Risk 60-74 years 1-dose series) Never done    DEXA Scan  2019    COVID-19 Vaccine (3 - 2024- season) 2024    Mammogram  2024        Past Medical History:  Past Medical History:   Diagnosis Date    Anxiety disorder, unspecified     Claustrophobia     Depression     Descending thoracic aortic dissection     High cholesterol     Insomnia     Mitral valve prolapse     Obesity, unspecified     Personal history of colonic polyps      Past Surgical History:   Procedure Laterality Date    CATARACT EXTRACTION       SECTION      FRACTURE SURGERY  2024    HYSTERECTOMY      NECK SURGERY      OPEN REDUCTION AND INTERNAL FIXATION (ORIF) OF PILON FRACTURE Right 2024    Procedure: ORIF, FRACTURE, PILON;  Surgeon: Zoran Sanchez MD;  Location: [facility];  Service: Orthopedics;  Laterality: Right;  EX-FIX -VS- ORIF RIGHT ANKLE //  SPINE // VASC // BONE FOAM // SYNTHES     Review of patient's allergies indicates:  No Known Allergies  Medications Ordered Prior to  "Encounter[1]  Social History[2]  Family History   Problem Relation Name Age of Onset    No Known Problems Mother      No Known Problems Father      Heart attack Neg Hx      Fainting Neg Hx      Heart disease Neg Hx      Heart failure Neg Hx      Hyperlipidemia Neg Hx      Hypertension Neg Hx      Stroke Neg Hx      Atrial Septal Defect Neg Hx         ROS:   Comprehensive review of systems was performed and is negative except as noted above    Vitals/PE:   /72 (BP Location: Left arm, Patient Position: Sitting)   Pulse 69   Ht 5' 4" (1.626 m)   Wt 66.7 kg (147 lb)   SpO2 97%   BMI 25.23 kg/m²   Physical Exam    General: Alert and oriented, No acute distress.   Eye: Normal conjunctiva without exudate.  HENMT: Normocephalic/AT, Normal hearing, Oral mucosa is moist and pink   Neck: No goiter visualized.   Respiratory: Lungs CTAB, Respirations are non-labored, Breath sounds are equal, Symmetrical chest wall expansion.  Cardiovascular: Normal rate, Regular rhythm, No murmur, No edema.   Gastrointestinal: Non-distended.   Genitourinary: Deferred.  Musculoskeletal: Normal ROM, Normal gait, No deformities or amputations.  Integumentary: Warm, Dry, Intact. No diaphoresis, or flushing.  Neurologic: No focal deficits, Cranial Nerves II-XII are grossly intact.   Psychiatric: Cooperative, Appropriate mood & affect, Normal judgment, Non-suicidal.    Assessment/Plan:       1. Breast cancer screening by mammogram  -     Mammo Digital Screening Bilat; Future; Expected date: 05/22/2025    2. Annual physical exam  Overview:  Screening uptodate  Dexa this year        3. Descending thoracic aortic dissection  Overview:  Patient is status post C tag for type B aortic dissection with persistent pain and extravasation.  She has been without complaints since discharge.      4. Hyperlipidemia, unspecified hyperlipidemia type  Overview:  Elevated calcium score, aortic atherosclerosis, and LDL around 140. Recommended rosuvastatin 5 x " 1. Additionally discussed the benefits of a plant based diet. Recommend increase dose           Would like some Halcion for travel.    Education and counseling done face to face regarding medical conditions and plan. Contact office if new symptoms develop. Should any symptoms ever significantly worsen seek emergency medical attention/go to ER. Follow up at least yearly for wellness or sooner PRN. Nurse to call patient with any results. The patient is receptive, expresses understanding and is agreeable to plan. All questions have been answered.    No follow-ups on file.             [1]   Current Outpatient Medications on File Prior to Visit   Medication Sig Dispense Refill    nystatin (MYCOSTATIN) powder APPLY TOPICALLY 4 TIMES DAILY 60 g 1    rosuvastatin (CRESTOR) 5 MG tablet Take 1 tablet (5 mg total) by mouth once daily. 90 tablet 3    [DISCONTINUED] aspirin (ECOTRIN) 81 MG EC tablet Take 81 mg by mouth once daily. (Patient not taking: Reported on 5/22/2025)      [DISCONTINUED] traZODone (DESYREL) 50 MG tablet TAKE ONE TABLET BY MOUTH NIGHTLY AS NEEDED FOR insomnia (Patient not taking: Reported on 5/22/2025) 30 tablet 0     No current facility-administered medications on file prior to visit.   [2]   Social History  Socioeconomic History    Marital status: Single   Tobacco Use    Smoking status: Former     Current packs/day: 1.50     Average packs/day: 1.5 packs/day for 30.0 years (45.0 ttl pk-yrs)     Types: Cigarettes    Smokeless tobacco: Never    Tobacco comments:     Quit 20 years ago   Substance and Sexual Activity    Alcohol use: Yes     Alcohol/week: 3.0 standard drinks of alcohol     Types: 3 Glasses of wine per week    Drug use: Never    Sexual activity: Not Currently     Partners: Male     Birth control/protection: None     Social Drivers of Health     Financial Resource Strain: Low Risk  (5/21/2025)    Overall Financial Resource Strain (CARDIA)     Difficulty of Paying Living Expenses: Not hard at all    Food Insecurity: No Food Insecurity (5/21/2025)    Hunger Vital Sign     Worried About Running Out of Food in the Last Year: Never true     Ran Out of Food in the Last Year: Never true   Transportation Needs: No Transportation Needs (5/21/2025)    PRAPARE - Transportation     Lack of Transportation (Medical): No     Lack of Transportation (Non-Medical): No   Physical Activity: Sufficiently Active (5/21/2025)    Exercise Vital Sign     Days of Exercise per Week: 6 days     Minutes of Exercise per Session: 60 min   Stress: Stress Concern Present (5/21/2025)    [employer] [place] [employer] - Occupational Stress Questionnaire     Feeling of Stress : To some extent   Housing Stability: Low Risk  (5/21/2025)    Housing Stability Vital Sign     Unable to Pay for Housing in the Last Year: No     Number of Times Moved in the Last Year: 0     Homeless in the Last Year: No

## 2025-05-26 ENCOUNTER — HOSPITAL ENCOUNTER (OUTPATIENT)
Dept: RADIOLOGY | Facility: HOSPITAL | Age: 70
Discharge: HOME | End: 2025-05-26
Attending: THORACIC SURGERY (CARDIOTHORACIC VASCULAR SURGERY)
Payer: MEDICARE

## 2025-05-26 DIAGNOSIS — I71.012 DESCENDING THORACIC AORTIC DISSECTION: ICD-10-CM

## 2025-05-26 PROCEDURE — 25500020 PHARM REV CODE 255: Performed by: THORACIC SURGERY (CARDIOTHORACIC VASCULAR SURGERY)

## 2025-05-26 PROCEDURE — 74174 CTA ABD&PLVS W/CONTRAST: CPT | Mod: TC

## 2025-05-26 RX ADMIN — IOHEXOL 75 ML: 350 INJECTION, SOLUTION INTRAVENOUS at 09:05

## 2025-05-27 ENCOUNTER — HOSPITAL ENCOUNTER (OUTPATIENT)
Dept: RADIOLOGY | Facility: HOSPITAL | Age: 70
Discharge: HOME | End: 2025-05-27
Attending: INTERNAL MEDICINE
Payer: MEDICARE

## 2025-05-27 DIAGNOSIS — Z12.31 BREAST CANCER SCREENING BY MAMMOGRAM: ICD-10-CM

## 2025-05-27 PROCEDURE — 77063 BREAST TOMOSYNTHESIS BI: CPT | Mod: TC

## 2025-05-27 PROCEDURE — 77067 SCR MAMMO BI INCL CAD: CPT | Mod: 26,,, | Performed by: RADIOLOGY

## 2025-05-27 PROCEDURE — 77063 BREAST TOMOSYNTHESIS BI: CPT | Mod: 26,,, | Performed by: RADIOLOGY

## 2025-05-28 ENCOUNTER — OFFICE VISIT (OUTPATIENT)
Dept: CARDIOLOGY | Facility: CLINIC | Age: 70
End: 2025-05-28
Payer: MEDICARE

## 2025-05-28 DIAGNOSIS — I73.9 PVD (PERIPHERAL VASCULAR DISEASE): ICD-10-CM

## 2025-05-28 DIAGNOSIS — E78.2 MIXED HYPERLIPIDEMIA: ICD-10-CM

## 2025-05-28 DIAGNOSIS — I25.10 ATHEROSCLEROSIS OF NATIVE CORONARY ARTERY OF NATIVE HEART WITHOUT ANGINA PECTORIS: Primary | ICD-10-CM

## 2025-05-28 DIAGNOSIS — I10 PRIMARY HYPERTENSION: ICD-10-CM

## 2025-05-28 RX ORDER — ROSUVASTATIN CALCIUM 5 MG/1
5 TABLET, COATED ORAL DAILY
Qty: 90 TABLET | Refills: 3 | Status: SHIPPED | OUTPATIENT
Start: 2025-05-28 | End: 2026-05-28
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
## 2025-05-29 ENCOUNTER — OFFICE VISIT (OUTPATIENT)
Dept: CARDIAC SURGERY | Facility: CLINIC | Age: 70
End: 2025-05-29
Payer: MEDICARE

## 2025-05-29 VITALS
WEIGHT: 147.06 LBS | SYSTOLIC BLOOD PRESSURE: 120 MMHG | OXYGEN SATURATION: 96 % | HEART RATE: 65 BPM | DIASTOLIC BLOOD PRESSURE: 73 MMHG | BODY MASS INDEX: 25.11 KG/M2 | HEIGHT: 64 IN

## 2025-05-29 DIAGNOSIS — I71.012 DESCENDING THORACIC AORTIC DISSECTION: Primary | ICD-10-CM

## 2025-05-29 PROCEDURE — 99214 OFFICE O/P EST MOD 30 MIN: CPT | Mod: ,,, | Performed by: THORACIC SURGERY (CARDIOTHORACIC VASCULAR SURGERY)

## 2025-07-28 DIAGNOSIS — F32.0 CURRENT MILD EPISODE OF MAJOR DEPRESSIVE DISORDER WITHOUT PRIOR EPISODE: ICD-10-CM

## 2025-07-28 RX ORDER — NYSTATIN 100000 [USP'U]/G
POWDER TOPICAL
Qty: 60 G | Refills: 1 | Status: SHIPPED | OUTPATIENT
Start: 2025-07-28 | End: 2025-07-30 | Stop reason: SDUPTHER

## 2025-07-30 ENCOUNTER — TELEPHONE (OUTPATIENT)
Dept: INTERNAL MEDICINE | Facility: CLINIC | Age: 70
End: 2025-07-30
Payer: MEDICARE

## 2025-07-30 DIAGNOSIS — B37.2 YEAST DERMATITIS: Primary | ICD-10-CM

## 2025-07-30 DIAGNOSIS — F32.0 CURRENT MILD EPISODE OF MAJOR DEPRESSIVE DISORDER WITHOUT PRIOR EPISODE: ICD-10-CM

## 2025-07-30 RX ORDER — NYSTATIN 100000 [USP'U]/G
POWDER TOPICAL 3 TIMES DAILY
Qty: 60 G | Refills: 0 | Status: SHIPPED | OUTPATIENT
Start: 2025-07-30

## 2025-07-30 NOTE — TELEPHONE ENCOUNTER
Copied from CRM #[id number]. Topic: Medications - Medication Question  >> Jul 30, 2025 12:26 PM Matt Blanc wrote:  Who Called: Deloris    Caller is requesting assistance/information from provider's office.    Symptoms (please be specific):    How long has patient had these symptoms:    List of preferred pharmacies on file (remove unneeded): [facility] [place], [address]  If different, enter pharmacy into here including location and phone number:       Preferred Method of Contact: Phone Call  Patient's Preferred Phone Number on File: [phone]   Best Call Back Number, if different:[phone]  Additional Information: yeast underneath the breast pt is needing a cream to put on rash. Please advise

## 2025-08-25 DIAGNOSIS — Z00.00 ANNUAL PHYSICAL EXAM: ICD-10-CM

## 2025-08-25 RX ORDER — TRIAZOLAM 0.25 MG/1
TABLET ORAL
Qty: 15 TABLET | Refills: 0 | Status: SHIPPED | OUTPATIENT
Start: 2025-08-25

## (undated) DEVICE — SUT 3-0 ETHILON 18 FS-1

## (undated) DEVICE — COVER FULLGUARD SHOE HIGH-TOP

## (undated) DEVICE — TOURNIQUET 2 PORT BLUE 34X4IN

## (undated) DEVICE — GLOVE PROTEXIS BLUE LATEX 7

## (undated) DEVICE — SPONGE COTTON TRAY 4X4IN

## (undated) DEVICE — GLOVE PROTEXIS PI CRM 7

## (undated) DEVICE — ELECTRODE REM POLYHESIVE II

## (undated) DEVICE — Device

## (undated) DEVICE — DEVICE PLASMABLADE X 3.0S LT

## (undated) DEVICE — PAD CAST 6X4YD SPECIALISTIC

## (undated) DEVICE — DRAPE STERI U-SHAPED 47X51IN

## (undated) DEVICE — GLOVE PROTEXIS LTX MICRO 8

## (undated) DEVICE — PAD UNDERCAST WEBRIL 2IN

## (undated) DEVICE — DRESSING XEROFORM 5X9IN

## (undated) DEVICE — GUIDEWIRE TROCAR TIP 1.4X150MM
Type: IMPLANTABLE DEVICE | Site: ANKLE | Status: NON-FUNCTIONAL
Removed: 2024-01-02

## (undated) DEVICE — BIT BRILL 2.5

## (undated) DEVICE — ELECTRODE PATIENT RETURN DISP

## (undated) DEVICE — SCREW CORTEX 3.5 X 30
Type: IMPLANTABLE DEVICE | Site: ANKLE | Status: NON-FUNCTIONAL
Removed: 2024-01-02

## (undated) DEVICE — SPONGE GAUZE 4X4 12 PLY STRL

## (undated) DEVICE — BIT DRILL CANN COMPR 2.7X145MM

## (undated) DEVICE — GOWN SMARTSLEEVE AAMI LVL4 XL

## (undated) DEVICE — BIT DRILL 2.0MM D DEPTH 110MM

## (undated) DEVICE — DRESSING GAUZE XEROFORM 5X9

## (undated) DEVICE — BANDAGE ESMARK 6INX3YD

## (undated) DEVICE — APPLICATOR CHLORAPREP ORN 26ML

## (undated) DEVICE — SUT BONE WAX 2.5 GRMS 12/BX

## (undated) DEVICE — TAPE SILK 3IN

## (undated) DEVICE — DRAPE C-ARMOR EQUIPMENT COVER

## (undated) DEVICE — GLOVE 8 PROTEXIS PI ORTHO

## (undated) DEVICE — DRESSING XEROFORM 1X8IN

## (undated) DEVICE — CUFF ATS 2 PORT SNGL BLDR 34IN

## (undated) DEVICE — POSITIONER LEG CONCAVE 45D AGL

## (undated) DEVICE — SUT ETHILON 3-0 FS-1 30

## (undated) DEVICE — PADDING WYTEX UNDRCST 6INX4YD

## (undated) DEVICE — COVER TABLE HVY DTY 60X90IN

## (undated) DEVICE — SUT VICRYL BR 1 GEN 27 CT-1

## (undated) DEVICE — DRAPE ORTH SPLIT 77X108IN